# Patient Record
Sex: MALE | Race: WHITE
[De-identification: names, ages, dates, MRNs, and addresses within clinical notes are randomized per-mention and may not be internally consistent; named-entity substitution may affect disease eponyms.]

---

## 2018-08-17 ENCOUNTER — HOSPITAL ENCOUNTER (OUTPATIENT)
Dept: HOSPITAL 97 - OR | Age: 68
Discharge: HOME | End: 2018-08-17
Attending: SURGERY
Payer: COMMERCIAL

## 2018-08-17 DIAGNOSIS — K64.8: ICD-10-CM

## 2018-08-17 DIAGNOSIS — Z12.11: Primary | ICD-10-CM

## 2018-08-17 DIAGNOSIS — Z82.49: ICD-10-CM

## 2018-08-17 DIAGNOSIS — I10: ICD-10-CM

## 2018-08-17 DIAGNOSIS — Z80.42: ICD-10-CM

## 2018-08-17 DIAGNOSIS — K57.30: ICD-10-CM

## 2018-08-17 PROCEDURE — 0DJD8ZZ INSPECTION OF LOWER INTESTINAL TRACT, VIA NATURAL OR ARTIFICIAL OPENING ENDOSCOPIC: ICD-10-PCS

## 2018-08-17 NOTE — ENDO RPT
[address], [address]





COLONOSCOPY PROCEDURE REPORT     EXAM DATE: 08/17/2018



PATIENT NAME:      Francisco Reagan           MR #:      [id number]

YOB: 1950      VISIT #:     [id number]

ATTENDING:     Milo Aragon DR     STATUS:     outpatient

ASSISTANT:      Elizabeth Elizondo and Lissa Miner RN



INDICATIONS:  The patient is a 67 yr old Male here for a colonoscopy due to

colon cancer screening

PROCEDURE PERFORMED:     Colonoscopy and Screening Colonoscopy

MEDICATIONS:     Per Anesthesia.

ESTIMATED BLOOD LOSS:     None



CONSENT: The patient understands the risks and benefits of the procedure and

understands that these risks include, but are not limited to: sedation,

allergic reaction, infection, perforation and/or bleeding. Alternative means of

evaluation and treatment include, among others: physical exam, x-rays, and/or

surgical intervention. The patient elects to proceed with this endoscopic

procedure.



DESCRIPTION OF PROCEDURE:  During intra-op preparation period all mechanical 

medical equipment was checked for proper function. Hand hygiene and appropriate

measures for infection prevention was taken.  Procedure, possible

complications, alternatives including, but not limited to possibility of

bleeding, perforation, tear, infection, sepsis, need for surgery, need for

blood transfusion, were explained to the patient.  After the risks, benefits

and alternatives of the procedure were thoroughly explained, Informed consent

was verified, confirmed and timeout was successfully executed by the treatment

team.  The patient was placed in the left lateral position.   A digital rectal

exam was performed and revealed internal hemorrhoids and A digital rectal exam

was performed and revealed an enlarged prostate.  After appropriate level of

anesthesia, the scope was passed.  The EC-[id number] ([id number] endoscope was

introduced through the anus and advanced to the cecum, which was identified by

both the appendix and ileocecal valve. The quality of the prep was good. The

instrument was then slowly withdrawn as the colon was fully examined.  Scope

withdrawal time was 9 minutes.



COLON FINDINGS: Mild diverticulosis was noted in the sigmoid colon.  No bleeding

was noted from the diverticulosis.   Moderate sized internal hemorrhoids were

found.  Retroflexed views revealed no abnormalities. The scope was then

completely withdrawn from the patient and the procedure terminated.







ADVERSE EVENTS:      There were no complications.

IMPRESSIONS:     1.  Mild diverticulosis was noted in the sigmoid colon

2.  Moderate sized internal hemorrhoids



RECOMMENDATIONS:     1.  fiber rich diet

2.  hemorrhoidal hygiene

3.  yearly hemoccult starting in 4 years

RECALL:     Return in 10 year(s) for Colonoscopy.



_____________________________

Milo Aragon DR

eSigned:  Milo Aragon DR 08/17/2018 9:01 AM





cc:



CPT CODES:

ICD9 CODES:





PATIENT NAME:  Francisco ReaganBrian

MR#: [id number]

## 2022-12-21 ENCOUNTER — HOSPITAL ENCOUNTER (EMERGENCY)
Dept: HOSPITAL 97 - ER | Age: 72
Discharge: HOME | End: 2022-12-21
Payer: COMMERCIAL

## 2022-12-21 VITALS — OXYGEN SATURATION: 98 % | SYSTOLIC BLOOD PRESSURE: 138 MMHG | DIASTOLIC BLOOD PRESSURE: 91 MMHG

## 2022-12-21 VITALS — TEMPERATURE: 97.8 F

## 2022-12-21 DIAGNOSIS — E86.0: Primary | ICD-10-CM

## 2022-12-21 LAB
ALBUMIN SERPL BCP-MCNC: 3.3 G/DL (ref 3.4–5)
ALP SERPL-CCNC: 71 U/L (ref 45–117)
ALT SERPL W P-5'-P-CCNC: 17 U/L (ref 16–61)
AST SERPL W P-5'-P-CCNC: 13 U/L (ref 15–37)
BUN BLD-MCNC: 17 MG/DL (ref 7–18)
GLUCOSE SERPLBLD-MCNC: 99 MG/DL (ref 74–106)
HCT VFR BLD CALC: 38.2 % (ref 39.6–49)
LYMPHOCYTES # SPEC AUTO: 1.1 K/UL (ref 0.7–4.9)
MAGNESIUM SERPL-MCNC: 2.2 MG/DL (ref 1.6–2.4)
MCV RBC: 91 FL (ref 80–100)
PMV BLD: 7.8 FL (ref 7.6–11.3)
POTASSIUM SERPL-SCNC: 3.7 MMOL/L (ref 3.5–5.1)
RBC # BLD: 4.2 M/UL (ref 4.33–5.43)
TROPONIN I SERPL HS-MCNC: 9.1 PG/ML (ref ?–58.9)

## 2022-12-21 PROCEDURE — 80053 COMPREHEN METABOLIC PANEL: CPT

## 2022-12-21 PROCEDURE — 36415 COLL VENOUS BLD VENIPUNCTURE: CPT

## 2022-12-21 PROCEDURE — 93005 ELECTROCARDIOGRAM TRACING: CPT

## 2022-12-21 PROCEDURE — 83735 ASSAY OF MAGNESIUM: CPT

## 2022-12-21 PROCEDURE — 85025 COMPLETE CBC W/AUTO DIFF WBC: CPT

## 2022-12-21 PROCEDURE — 81015 MICROSCOPIC EXAM OF URINE: CPT

## 2022-12-21 PROCEDURE — 81003 URINALYSIS AUTO W/O SCOPE: CPT

## 2022-12-21 PROCEDURE — 82550 ASSAY OF CK (CPK): CPT

## 2022-12-21 PROCEDURE — 84484 ASSAY OF TROPONIN QUANT: CPT

## 2022-12-21 NOTE — XMS REPORT
Continuity of Care Document

                          Created on:2022



Patient:FRANCISCO QIU

Sex:Male

:1950

External Reference #:[id number]





Demographics







                          Address                   [address] [place] [address]

 

                          Home Phone                [phone]

 

                          Mobile Phone              [phone]

 

                          Email Address             [email or web address]

 

                          Preferred Language        en

 

                          Marital Status            Unknown

 

                          Faith Affiliation     Unknown

 

                          Race                      Unknown

 

                          Additional Race(s)        White

 

                          Ethnic Group              Not  or 









Author







                          Organization              [facility]

t

 

                          Address                   [address]



                                                    [place] [address]

 

                          Phone                     [phone]









Support







                Name            Relationship    Address         Phone

 

                Francisco Qiu     [address] [address] [phone]



                                                [place] [address] 

 

                Franchesca Qiu Spouse          [address] [address] [phone]



                                                [place] [address] 









Care Team Providers







                    Name                Role                Phone

 

                    ErvinKiah ACOSTA  Primary Care Physician [phone]

 

                    Sheri Medrano        Attending Clinician Unavailable

 

                    JIM SHEFFIELD      Attending Clinician Unavailable

 

                    JIM SHEFFIELD      Attending Clinician Unavailable

 

                    Zion Mahan MD Attending Clinician [phone]

 

                    Jim Sheffield MD   Attending Clinician [phone]

 

                    Doctor Unassigned, No Name Attending Clinician Unavailable

 

                    Angelita Michelle LMSW Attending Clinician [phone]

 

                    ZION MAHAN Attending Clinician Unavailable

 

                    ZION MAHAN Attending Clinician Unavailable

 

                    Nettie Garcia PA-C   Attending Clinician [phone]

 

                    NETTIE GARCIA        Attending Clinician Unavailable

 

                    ELLY TYSON      Attending Clinician Unavailable

 

                    Elly Hinton  Attending Clinician [phone]

 

                    Eduardo Gamez MD Attending Clinician [phone]









Payers







           Payer Name Policy Type Policy Number Effective Date Expiration Date S

ource MEDICARE MB         [id number]                       [facility]



           NOVITAS                                                - [facility]

 

           [employer] C1         [id number]                       [facility]

t



           INS [place]                                                 - [facility]







Problems







       Condition Condition Condition Status Onset  Resolution Last   Treating Co

mments 

Source



       Name   Details Category        Date   Date   Treatment Clinician        



                                                 Date                 

 

       S/P total S/P total Disease Active                              Uni

vers



       hip    hip                  2-20                               ity of



       arthroplas arthroplas               00:00:                             [facility]

[place]



       ty     ty                   00                                 Medical



                                                                      [facility]

 

       [id number] Hyperlipid Problem                                           Com

mon



              emia,                                                   [facility]



              [facility]specifie                                                  [facility]



              d                                                       



              hyperlipid                                                  [facility]



              emia type                                                  [facility]

 

       [id number] Screening Problem                                           Com

mon



              for                                                     [facility]



              prostate                                                  - [facility]



              cancer                                                  [facility]

 

       [id number] Memory Problem                                           Common



              loss                                                    [facility]



                                                                      - [facility]

 

       [id number] Cognitive Problem                                           Com

mon



              dysfunctio                                                  [facility]



              n                                                       - [facility]



                                                                      [facility]

 

       [id number] Alzheimer' Problem                                           C

ommon



       [id number] s disease                                                  [facility]



              with late                                                  - [facility]



              onset                                                   [facility]

 

       [id number] +6th digit Problem                                           C

ommon



       103    eff                                                     [facility]



              10/1/22*De                                                  - [facility]



              mentia in                                                  [facility]                                                  Center



              elsewhere                                                  



              with                                                    



              behavioral                                                  



              disturbanc                                                  



              e                                                       

 

       Dementia Dementia Problem                                           Commo

n



                                                                      [facility]







Allergies, Adverse Reactions, Alerts







       Allergy Allergy Status Severity Reaction(s) Onset  Inactive Treating Comm

ents 

Source



       Name   Type                        Date   Date   Clinician        

 

       NO KNOWN Drug   Active                                           [facility]



       ALLERGIE Class                                                   [facility] of



       S                                                              [facility]







Social History







           Social Habit Start Date Stop Date  Quantity   Comments   Source

 

           History of                                             [facility] 

-



           Tobacco Use                                             [facility]

 

           Sex Assigned At                                             [facility]

paul -



           Birth                                                  [facility]

 

           Exposure to 2022 Not sure              [facility]



           SARS-CoV-2 00:00:00   09:01:00                         [facility]



           (event)                                                [facility]

 

           Alcohol intake 2022 0 /d                  [facility]

 



                      00:00:00   00:00:00                         [facility]

 

           Tobacco use and 2016 Smokeless tobacco            Un

iversity of



           exposure   00:00:00   00:00:00   non-user              [facility]









                Smoking Status  Start Date      Stop Date       Source

 

                Never smoked tobacco                                 [facility]







Medications







       Ordered Filled Start  Stop   Current Ordering Indication Dosage Frequency

 Signature

                    Comments            Components          Source



     Medication Medication Date Date Medication? Clinician                (SIG) 

          



     Name Name                                                   

 

     rivastigmin      2022      Yes       [id number] 1{patch      Apply 1       

    [facility]



     e 9.5 mg/24      1-16                     }         Patch to           ity 

of



     hour patch      00:00:                               skin in           [place]

s



               00                                 the            Medical



                                                  morning.           Branch

 

     rivastigmin      2022      Yes       [id number] 1{patch      Apply 1       

    [facility]



     e 9.5 mg/24      1-16                     }         Patch to           ity 

of



     hour patch      00:00:                               skin in           [place]

                                 the            Medical



                                                  morning.           Branch

 

     rivastigmin      2022      Yes       [id number] 1{patch      Apply 1       

    [facility]



     e 9.5 mg/24      1-16                     }         Patch to           ity 

of



     hour patch      00:00:                               skin in           [place]

                                 the            Medical



                                                  morning.           Branch

 

     rivastigmin      2022      Yes       [id number] 1{patch      Apply 1       

    [facility]



     e 9.5 mg/24      1-16                     }         Patch to           ity 

of



     hour patch      00:00:                               skin in           [place]

                                 the            Medical



                                                  morning.           Branch

 

     rivastigmin      2022      Yes       [id number] 1{patch      Apply 1       

    [facility]



     e 9.5 mg/24      1-16                     }         Patch to           ity 

of



     hour patch      00:00:                               skin in           a

                                 the            Medical



                                                  morning.           Branch

 

     rivastigmin      2022      Yes       [id number] 1{patch      Apply 1       

    [facility]



     e 9.5 mg/24      1-16                     }         Patch to           ity 

of



     hour patch      00:00:                               skin in           a

                                 the            Medical



                                                  morning.           Branch

 

     rivastigmin            Yes            13.3mg      Apply 13.3         

  [facility]



     e (EXELON      9-14                               mg to skin           ity 

of



     PATCH) 13.3      00:00:                               daily.           [place]

s



     mg/24 hour      00                                                Medical



     PT24                                                        Branch

 

     rivastigmin      -0      Yes            13.3mg      Apply 13.3         

  [facility]



     e (EXELON      9-14                               mg to skin           ity 

of



     PATCH) 13.3      00:00:                               daily.           [place]

s



     mg/24 hour      00                                                Medical



     PT24                                                        Branch

 

     rivastigmin      -0      Yes            13.3mg      Apply 13.3         

  [facility]



     e (EXELON      9-14                               mg to skin           ity 

of



     PATCH) 13.3      00:00:                               daily.           [place]

s



     mg/24 hour      00                                                Medical



     PT24                                                        Branch

 

     rivastigmin      -0      Yes            13.3mg      Apply 13.3         

  [facility]



     e (EXELON      9-14                               mg to skin           ity 

of



     PATCH) 13.3      00:00:                               daily.           [place]

s



     mg/24 hour      00                                                Medical



     PT24                                                        Branch

 

     rivastigmin      -0      Yes            13.3mg      Apply 13.3         

  Univers



     e (EXELON      9-14                               mg to skin           ity 

of



     PATCH) 13.3      00:00:                               daily.           [place]

s



     mg/24 hour      00                                                Medical



     PT24                                                        Branch

 

     rivastigmin      -0      Yes            13.3mg      Apply 13.3         

  Univers



     e (EXELON      9-14                               mg to skin           ity 

of



     PATCH) 13.3      00:00:                               daily.           [place]

s



     mg/24 hour      00                                                Medical



     PT24                                                        Branch

 

     rivastigmin      -0      Yes            13.3mg      Apply 13.3         

  Univers



     e (EXELON      9-14                               mg to skin           ity 

of



     PATCH) 13.3      00:00:                               daily.           [place]

s



     mg/24 hour      00                                                Medical



     PT24                                                        Branch

 

     rivastigmin      -0      Yes            13.3mg      Apply 13.3         

  Univers



     e (EXELON      9-14                               mg to skin           ity 

of



     PATCH) 13.3      00:00:                               daily.           [place]

s



     mg/24 hour      00                                                Medical



     PT24                                                        Branch

 

     rivastigmin      -0      Yes            13.3mg      Apply 13.3         

  Univers



     e (EXELON      9-14                               mg to skin           ity 

of



     PATCH) 13.3      00:00:                               daily.           [place]

s



     mg/24 hour      00                                                Medical



     PT24                                                        Branch

 

     rivastigmin      -0      Yes            13.3mg      Apply 13.3         

  Univers



     e (EXELON      9-14                               mg to skin           ity 

of



     PATCH) 13.3      00:00:                               daily.           [place]

s



     mg/24 hour      00                                                Medical



     PT24                                                        Branch

 

     rivastigmin      -0      Yes            13.3mg      Apply 13.3         

  Univers



     e (EXELON      9-14                               mg to skin           ity 

of



     PATCH) 13.3      00:00:                               daily.           [place]

s



     mg/24 hour      00                                                Medical



     PT24                                                        Branch

 

     rivastigmin      -0      Yes            13.3mg      Apply 13.3         

  Univers



     e (EXELON      9-14                               mg to skin           ity 

of



     PATCH) 13.3      00:00:                               daily.           [place]

s



     mg/24 hour      00                                                Medical



     PT24                                                        Branch

 

     rivastigmin      -0      Yes            13.3mg      Apply 13.3         

  Univers



     e (EXELON      9-14                               mg to skin           ity 

of



     PATCH) 13.3      00:00:                               daily.           [place]

s



     mg/24 hour      00                                                Medical



     PT24                                                        Branch

 

     rivastigmin      -0      Yes            13.3mg      Apply 13.3         

  Univers



     e (EXELON      9-14                               mg to skin           ity 

of



     PATCH) 13.3      00:00:                               daily.           [place]

s



     mg/24 hour      00                                                Medical



     PT24                                                        Branch

 

     rivastigmin            Yes            13.3mg      Apply 13.3         

  [facility]



     e (EXELON      9-14                               mg to skin           ity 

of



     PATCH) 13.3      00:00:                               daily.           [place]

s



     mg/24 hour      00                                                Medical



     PT24                                                        Branch

 

     rivastigmin      2022- No             13.3mg      Apply 13.3        

   Univers



     e (EXELON      9-14 11-16                          mg to skin           ity

 of



     PATCH) 13.3      00:00: 00:00                          daily.           [place]

as



     mg/24 hour      00   :00                                          Medical



     PT24                                                        Branch

 

     EXELON      2022- No             13.3mg      Apply 13.3           Un

nelly



     PATCH 13.3      9-08 09-14                          mg to skin           it

y of



     mg/24 hour      00:00: 00:00                          daily.           [place]

s



     PT24      00   :00                                          Medical



                                                                 Branch

 

     divalproex            Yes       [id number] 125mg      Take 1        

   Univers



     Sprinkles      9-06                [id number]           capsule by           it

y of



     (DEPAKOTE      00:00:                               mouth           [place]



     SPRINKLES)      00                                 every 12           Medic

al



     125 mg                                         (twelve)           Branch



     SPRINKLE                                         hours.           



     capsule                                                        

 

     divalproex            Yes       [id number] 125mg      Take 1        

   Univers



     Sprinkles      9-06                [id number]           capsule by           it

y of



     (DEPAKOTE      00:00:                               mouth           [place]



     SPRINKLES)      00                                 every 12           Medic

al



     125 mg                                         (twelve)           Branch



     SPRINKLE                                         hours.           



     capsule                                                        

 

     divalproex            Yes       [id number] 125mg      Take 1        

   Univers



     Sprinkles      9-06                [id number]           capsule by           it

y of



     (DEPAKOTE      00:00:                               mouth           [place]



     SPRINKLES)      00                                 every 12           Medic

al



     125 mg                                         (twelve)           Branch



     SPRINKLE                                         hours.           



     capsule                                                        

 

     divalproex            Yes       [id number] 125mg      Take 1           

Univers



     Sprinkles      9-06                               capsule by           ity 

of



     (DEPAKOTE      00:00:                               mouth           [place]



     SPRINKLES)      00                                 every 12           Medic

al



     125 mg                                         (twelve)           Branch



     SPRINKLE                                         hours.           



     capsule                                                        

 

     divalproex            Yes       [id number] 125mg      Take 1           

Univers



     Sprinkles      9-06                               capsule by           ity 

of



     (DEPAKOTE      00:00:                               mouth           [place]



     SPRINKLES)      00                                 every 12           Medic

al



     125 mg                                         (twelve)           Branch



     SPRINKLE                                         hours.           



     capsule                                                        

 

     divalproex      2022-0      Yes       [id number] 125mg      Take 1           

Univers



     Sprinkles      9-06                               capsule by           ity 

of



     (DEPAKOTE      00:00:                               mouth           [place]



     SPRINKLES)      00                                 every 12           Medic

al



     125 mg                                         (twelve)           Branch



     SPRINKLE                                         hours.           



     capsule                                                        

 

     divalproex      0      Yes       [id number] 125mg      Take 1           

Univers



     Sprinkles      9-06                               capsule by           ity 

of



     (DEPAKOTE      00:00:                               mouth           [place]



     SPRINKLES)      00                                 every 12           Medic

al



     125 mg                                         (twelve)           Branch



     SPRINKLE                                         hours.           



     capsule                                                        

 

     divalproex            Yes       [id number] 125mg      Take 1           

Univers



     Sprinkles      9-06                               capsule by           ity 

of



     (DEPAKOTE      00:00:                               mouth           [place]



     SPRINKLES)      00                                 every 12           Medic

al



     125 mg                                         (twelve)           Branch



     SPRINKLE                                         hours.           



     capsule                                                        

 

     divalproex            Yes       [id number] 125mg      Take 1           

Univers



     Sprinkles      9-06                               capsule by           ity 

of



     (DEPAKOTE      00:00:                               mouth           [place]



     SPRINKLES)      00                                 every 12           Medic

al



     125 mg                                         (twelve)           Branch



     SPRINKLE                                         hours.           



     capsule                                                        

 

     divalproex            Yes       [id number] 125mg      Take 1           

Univers



     Sprinkles      9-06                               capsule by           ity 

of



     (DEPAKOTE      00:00:                               mouth           [place]



     SPRINKLES)      00                                 every 12           Medic

al



     125 mg                                         (twelve)           Branch



     SPRINKLE                                         hours.           



     capsule                                                        

 

     divalproex            Yes       [id number] 125mg      Take 1           

[facility]



     Sprinkles      9-06                               capsule by           [facility] 

of



     (DEPAKOTE      00:00:                               mouth           [place]



     SPRINKLES)      00                                 every 12           [facility]

al



     125 mg                                         (twelve)           [facility]



     SPRINKLE                                         hours.           



     capsule                                                        

 

     divalproex            Yes       [id number] 125mg      Take 1           

[facility]



     Sprinkles      9-06                               capsule by           [facility] 

of



     (DEPAKOTE      00:00:                               mouth           [place]



     SPRINKLES)      00                                 every 12           Medic

al



     125 mg                                         (twelve)           [facility]



     SPRINKLE                                         hours.           



     capsule                                                        

 

     divalproex      -0      Yes       [id number] 125mg      Take 1           

[facility]



     Sprinkles      9-06                               capsule by           [facility] 

of



     (DEPAKOTE      00:00:                               mouth           [place]



     SPRINKLES)      00                                 every 12           Med[facility]

al



     125 mg                                         (twelve)           [facility]



     SPRINKLE                                         hours.           



     capsule                                                        

 

     divalproex      0      Yes       [id number] 125mg      Take 1           

[facility]



     Sprinkles      9-06                               capsule by           [facility] 

of



     (DEPAKOTE      00:00:                               mouth           [place]



     SPRINKLES)      00                                 every 12           Med[facility]

al



     125 mg                                         (twelve)           [facility]



     SPRINKLE                                         hours.           



     capsule                                                        

 

     divalproex      2022-0      Yes       [id number] 125mg      Take 1           

[facility]



     Sprinkles      9-06                               capsule by           [facility] 

of



     (DEPAKOTE      00:00:                               mouth           [place]



     SPRINKLES)      00                                 every 12           Med[facility]

al



     125 mg                                         (twelve)           [facility]



     SPRINKLE                                         hours.           



     capsule                                                        

 

     divalproex      2022-0      Yes       [id number] 125mg      Take 1           

[facility]



     Sprinkles      9-06                               capsule by           [facility] 

of



     (DEPAKOTE      00:00:                               mouth           [place]



     SPRINKLES)      00                                 every 12           Med[facility]

al



     125 mg                                         (twelve)           [facility]



     SPRINKLE                                         hours.           



     capsule                                                        

 

     divalproex      2022-0      Yes       [id number] 125mg      Take 1           

[facility]



     Sprinkles      9-06                               capsule by           [facility] 

of



     (DEPAKOTE      00:00:                               mouth           [place]



     SPRINKLES)      00                                 every 12           Med[facility]

al



     125 mg                                         (twelve)           [facility]



     SPRINKLE                                         hours.           



     capsule                                                        

 

     divalproex      2022-0      Yes       [id number] 125mg      Take 1           

[facility]



     Sprinkles      9-06                               capsule by           [facility] 

of



     (DEPAKOTE      00:00:                               mouth           [place]



     SPRINKLES)      00                                 every 12           Med[facility]

al



     125 mg                                         (twelve)           [facility]



     SPRINKLE                                         hours.           



     capsule                                                        

 

     divalproex      2022-0      Yes       [id number] 125mg      Take 1           

[facility]



     Sprinkles      9-06                               capsule by           ity 

of



     (DEPAKOTE      00:00:                               mouth           [place]



     SPRINKLES)      00                                 every 12           Medic

al



     125 mg                                         (twelve)           [facility]



     SPRINKLE                                         hours.           



     capsule                                                        

 

     divalproex      2022-0      Yes       [id number] 125mg      Take 1           

[facility]



     Sprinkles      9-06                               capsule by           ity 

of



     (DEPAKOTE      00:00:                               mouth           [place]



     SPRINKLES)      00                                 every 12           Medic

al



     125 mg                                         (twelve)           [facility]



     SPRINKLE                                         hours.           



     capsule                                                        

 

     divalproex      2022-0      Yes       [id number] 125mg      Take 1           

[facility]



     Sprinkles      9-06                               capsule by           ity 

of



     (DEPAKOTE      00:00:                               mouth           [place]



     SPRINKLES)      00                                 every 12           Medic

al



     125 mg                                         (twelve)           [facility]



     SPRINKLE                                         hours.           



     capsule                                                        

 

     divalproex      2022-0      Yes       [id number] 125mg      Take 1           

[facility]



     Sprinkles      9-06                               capsule by           ity 

of



     (DEPAKOTE      00:00:                               mouth           [place]



     SPRINKLES)      00                                 every 12           Medic

al



     125 mg                                         (twelve)           [facility]



     SPRINKLE                                         hours.           



     capsule                                                        

 

     divalproex      2022-0      Yes       [id number] 125mg      Take 1           

[facility]



     Sprinkles      9-06                               capsule by           [facility] 

of



     (DEPAKOTE      00:00:                               mouth           [place]



     SPRINKLES)      00                                 every 12           Medic

al



     125 mg                                         (twelve)           [facility]



     SPRINKLE                                         hours.           



     capsule                                                        

 

     rivastigmin      0 - No        [id number] 13.3mg      Apply 13.3 

          [facility]



     e 13.3      9-06 09-14           [id number]           mg to skin           [facility] 

of



     mg/24 hour      00:00: 00:00                          daily.           [facility]

s



     PT24      00   :                                          Medical



                                                                 [facility]

 

     memantine 5      -0      Yes            5mg       Take 5 mg           U

nivers



     mg tablet      5-05                               by mouth           [facility] of



               00:00:                               daily.           [place]



                                                               [facility]



                                                                 [facility]

 

     memantine 5      -0      Yes            5mg       Take 5 mg           U

nivers



     mg tablet      5-05                               by mouth           [facility] of



               00:00:                               daily.           [place]



                                                               [facility]

 

     memantine 5      -0      Yes            5mg       Take 5 mg           U

nivers



     mg tablet      5-05                               by mouth           ity of



               00:00:                               daily.           [place]



                                                               [facility]

 

     memantine 5      -0      Yes            5mg       Take 5 mg           U

nivers



     mg tablet      5-05                               by mouth           ity of



               00:00:                               daily.           [place]



                                                               [facility]

 

     memantine 5      -0      Yes            5mg       Take 5 mg           U

nivers



     mg tablet      5-05                               by mouth           ity of



               00:00:                               daily.           [place]



                                                               [facility]

 

     memantine 5      -0      Yes            5mg       Take 5 mg           U

nivers



     mg tablet      5-05                               by mouth           ity of



               00:00:                               daily.           [place]



                                                               [facility]

 

     memantine 5      -0      Yes            5mg       Take 5 mg           U

nivers



     mg tablet      5-05                               by mouth           ity of



               00:00:                               daily.           [place]



                                                               [facility]

 

     memantine 5      -0      Yes            5mg       Take 5 mg           U

nivers



     mg tablet      5-05                               by mouth           ity of



               00:00:                               daily.           [place]



                                                               [facility]

 

     memantine 5      -0      Yes            5mg       Take 5 mg           U

nivers



     mg tablet      5-05                               by mouth           [facility] of



               00:00:                               daily.           [place]



                                                               [facility]

 

     memantine 5      -0      Yes            5mg       Take 5 mg           U

nivers



     mg tablet      5-05                               by mouth           ity of



               00:00:                               daily.           [place]



                                                               [facility]

 

     memantine 5      -0      Yes            5mg       Take 5 mg           U

nivers



     mg tablet      5-05                               by mouth           ity of



               00:00:                               daily.           [place]



               00                                                [facility]



                                                                 [facility]

 

     memantine 5      -0      Yes            5mg       Take 5 mg           U

nivers



     mg tablet      5-05                               by mouth           ity of



               00:00:                               daily.           [place]



               00                                                [facility]



                                                                 [facility]

 

     memantine 5      -0      Yes            5mg       Take 5 mg           U

nivers



     mg tablet      5-05                               by mouth           ity of



               00:00:                               daily.           [place]



                                                               [facility]



                                                                 [facility]

 

     memantine 5      -0      Yes            5mg       Take 5 mg           U

nivers



     mg tablet      5-05                               by mouth           ity of



               00:00:                               daily.           [place]



               00                                                [facility]



                                                                 [facility]

 

     memantine 5      -0      Yes            5mg       Take 5 mg           U

nivers



     mg tablet      5-05                               by mouth           ity of



               00:00:                               daily.           [place]



                                                               [facility]



                                                                 [facility]

 

     memantine 5      -0      Yes            5mg       Take 5 mg           U

nivers



     mg tablet      5-05                               by mouth           ity of



               00:00:                               daily.           [place]



                                                               [facility]

 

     memantine 5      -0      Yes            5mg       Take 5 mg           U

nivers



     mg tablet      5-05                               by mouth           ity of



               00:00:                               daily.           [place]



                                                               [facility]

 

     memantine 5      -0      Yes            5mg       Take 5 mg           U

nivers



     mg tablet      5-05                               by mouth           ity of



               00:00:                               daily.           [place]



                                                               [facility]



                                                                 [facility]

 

     memantine 5      -0      Yes            5mg       Take 5 mg           U

nivers



     mg tablet      5-05                               by mouth           ity of



               00:00:                               daily.           [place]



                                                               [facility]



                                                                 [facility]

 

     memantine 5      -0      Yes            5mg       Take 5 mg           U

nivers



     mg tablet      5-05                               by mouth           ity of



               00:00:                               daily.           [place]



                                                               [facility]

 

     memantine 5      -0      Yes            5mg       Take 5 mg           U

nivers



     mg tablet      5-05                               by mouth           ity of



               00:00:                               daily.           [place]



                                                               [facility]



                                                                 [facility]

 

     memantine 5      -0      Yes            5mg       Take 5 mg           U

nivers



     mg tablet      5-05                               by mouth           ity of



               00:00:                               daily.           [place]



                                                               [facility]



                                                                 [facility]anch

 

     memantine 5      -0      Yes            5mg       Take 5 mg           U

nivers



     mg tablet      5-05                               by mouth           ity of



               00:00:                               daily.           [place]



                                                               [facility]

 

     memantine 5      -0      Yes            5mg       Take 5 mg           U

nivers



     mg tablet      5-05                               by mouth           ity of



               00:00:                               daily.           [place]



                                                               [facility]

 

     CholestOff CholestOff 2018-0      Yes  Sheri                as             

Common



                          [place]                directed           [facility]



               00:00:                                              - [facility]



               00                                                [facility]

 

     Fish Oil Fish Oil       Yes  Sheri                1 capsule          

 Common



                          [place]                               [facility]



               00:00:                                              - [facility]



               00                                                [facility]

 

     Co Q10 Co Q10       Yes  Sheri                1 tablet           Comm

on



                          [place]                with a           [facility]



               00:00:                               meal           - [facility]



               00                                                [facility]

 

     CholestOff CholestOff       No                       CholestOff      

     



     450  MG -                               450 MG           



               00:00:                                              



               00                                                

 

     CholestOff CholestOff       No                       CholestOff      

     



     450  MG -                               450 MG           



               00:00:                                              



               00                                                

 

     Fish Oil Fish Oil       No             1{capsu QD   Fish Oil         

  



     1000 MG 1000 MG                      le}       1000 MG           



               00:00:                                              



               00                                                

 

     Co Q10 100 Co Q10 100       No             1{table QD   Co Q10 100   

        



     MG   MG                        t_with_      MG             



               00:00:                     a_meal}                     



               00                                                

 

     CholestOff CholestOff       No                       CholestOff      

     



     450  MG -                               450 MG           



               00:00:                                              



               00                                                

 

     Co Q10 100 Co Q10 100       No             1{table QD   Co Q10 100   

        



     MG   MG   -                     t_with_      MG             



               00:00:                     a_meal}                     



               00                                                

 

     Fish Oil Fish Oil       No             1{capsu QD   Fish Oil         

  



     1000 MG 1000 MG                      le}       1000 MG           



               00:00:                                              



               00                                                

 

     Co Q10 100 Co Q10 100       No             1{table QD   Co Q10 100   

        



     MG   MG   -                     t_with_      MG             



               00:00:                     a_meal}                     



                                                               

 

     Fish Oil Fish Oil       No             1{capsu QD   Fish Oil         

  



     1000 MG 1000 MG                      le}       1000 MG           



               00:00:                                              



               00                                                

 

     CholestOff CholestOff       No                       CholestOff      

     



     450  MG -                               450 MG           



               00:00:                                              



               00                                                

 

     Co Q10 100 Co Q10 100       No             1{table QD   Co Q10 100   

        



     MG   MG   -                     t_with_      MG             



               00:00:                     a_meal}                     



                                                               

 

     Fish Oil Fish Oil       No             1{capsu QD   Fish Oil         

  



     1000 MG 1000 MG                      le}       1000 MG           



               00:00:                                              



               00                                                

 

     CholestOff CholestOff       No                       CholestOff      

     



     450  MG -                               450 MG           



               00:00:                                              



               00                                                

 

     Co Q10 100 Co Q10 100       No             1{table QD   Co Q10 100   

        



     MG   MG   7-09                     t_with_      MG             



               00:00:                     a_meal}                     



               00                                                

 

     CholestOff CholestOff -      No                       CholestOff      

     



     450  MG 7-                               450 MG           



               00:00:                                              



               00                                                

 

     Fish Oil Fish Oil       No             1{capsu QD   Fish Oil         

  



     1000 MG 1000 MG 7                     le}       1000 MG           



               00:00:                                              



               00                                                

 

     CholestOff CholestOff       No                       CholestOff      

     



     450  MG 7-                               450 MG           



               00:00:                                              



               00                                                

 

     Fish Oil Fish Oil       No             1{capsu QD   Fish Oil         

  



     1000 MG 1000 MG 7                     le}       1000 MG           



               00:00:                                              



               00                                                

 

     Co Q10 100 Co Q10 100       No             1{table QD   Co Q10 100   

        



     MG   MG   7-                     t_with_      MG             



               00:00:                     a_meal}                     



               00                                                

 

     Co Q10 100 Co Q10 100       No             1{table QD   Co Q10 100   

        



     MG   MG   7-                     t_with_      MG             



               00:00:                     a_meal}                     



               00                                                

 

     Fish Oil Fish Oil       No             1{capsu QD   Fish Oil         

  



     1000 MG 1000 MG                      le}       1000 MG           



               00:00:                                              



               00                                                

 

     CholestOff CholestOff       No                       CholestOff      

     



     450  MG                                450 MG           



               00:00:                                              



               00                                                

 

     Co Q10 100 Co Q10 100       No             1{table QD   Co Q10 100   

        



     MG   MG   7-                     t_with_      MG             



               00:00:                     a_meal}                     



                                                               

 

     Fish Oil Fish Oil       No             1{capsu QD   Fish Oil         

  



     1000 MG 1000 MG                      le}       1000 MG           



               00:00:                                              



               00                                                

 

     FLUZONE      2017      Yes                      IMMUNIZATI           Univ

ers



     HIGH-DOSE      0-16                               ON GIVEN           ity of



     ,      00:00:                                              [place]



     PF, 180      00                                                Medical



     mcg/0.5 mL                                                        Branch

 

     FLUZONE      2017      Yes                      IMMUNIZATI           Univ

ers



     HIGH-DOSE      0-16                               ON GIVEN           ity of



     ,      00:00:                                              [place]



     PF, 180      00                                                Medical



     mcg/0.5 mL                                                        Branch

 

     FLUZONE      2017      Yes                      IMMUNIZATI           Univ

ers



     HIGH-DOSE      0-16                               ON GIVEN           ity of



     ,      00:00:                                              [place]



     PF, 180      00                                                Medical



     mcg/0.5 mL                                                        Branch

 

     FLUZONE      2017      Yes                      IMMUNIZATI           Univ

ers



     HIGH-DOSE      0-16                               ON GIVEN           ity of



     ,      00:00:                                              [place]



     PF, 180      00                                                Medical



     mcg/0.5 mL                                                        Branch

 

     FLUZONE      2017      Yes                      IMMUNIZATI           Univ

ers



     HIGH-DOSE      0-16                               ON GIVEN           ity of



     ,      00:00:                                              [place]



     PF, 180      00                                                Medical



     mcg/0.5 mL                                                        Branch

 

     FLUZONE      2017      Yes                      IMMUNIZATI           Univ

ers



     HIGH-DOSE      0-16                               ON GIVEN           ity of



     ,      00:00:                                              [place]



     PF, 180      00                                                Medical



     mcg/0.5 mL                                                        Branch

 

     FLUZONE      2017      Yes                      IMMUNIZATI           Univ

ers



     HIGH-DOSE      0-16                               ON GIVEN           ity of



     ,      00:00:                                              [place]



     PF, 180      00                                                Medical



     mcg/0.5 mL                                                        Branch

 

     FLUZONE      2017      Yes                      IMMUNIZATI           Univ

ers



     HIGH-DOSE      0-16                               ON GIVEN           ity of



     ,      00:00:                                              [place]



     PF, 180      00                                                Medical



     mcg/0.5 mL                                                        Branch

 

     FLUZONE      2017      Yes                      IMMUNIZATI           Univ

ers



     HIGH-DOSE      0-16                               ON GIVEN           ity of



     ,      00:00:                                              [place]



     PF, 180      00                                                Medical



     mcg/0.5 mL                                                        Branch

 

     FLUZONE      2017      Yes                      IMMUNIZATI           Univ

ers



     HIGH-DOSE      0-16                               ON GIVEN           ity of



     ,      00:00:                                              [place]



     PF, 180      00                                                Medical



     mcg/0.5 mL                                                        Branch

 

     FLUZONE      2017      Yes                      IMMUNIZATI           Univ

ers



     HIGH-DOSE      0-16                               ON GIVEN           ity of



     ,      00:00:                                              [place]



     PF, 180      00                                                Medical



     mcg/0.5 mL                                                        Branch

 

     FLUZONE      2017      Yes                      IMMUNIZATI           Univ

ers



     HIGH-DOSE      0-16                               ON GIVEN           ity of



     ,      00:00:                                              [place]



     PF, 180      00                                                Medical



     mcg/0.5 mL                                                        Branch

 

     FLUZONE      2017      Yes                      IMMUNIZATI           Univ

ers



     HIGH-DOSE      0-16                               ON GIVEN           ity of



     ,      00:00:                                              [place]



     PF, 180      00                                                Medical



     mcg/0.5 mL                                                        Branch

 

     FLUZONE      2017      Yes                      IMMUNIZATI           Univ

ers



     HIGH-DOSE      0-16                               ON GIVEN           ity of



     ,      00:00:                                              [place]



     PF, 180      00                                                Medical



     mcg/0.5 mL                                                        Branch

 

     FLUZONE      2017      Yes                      IMMUNIZATI           Univ

ers



     HIGH-DOSE      0-16                               ON GIVEN           ity of



     ,      00:00:                                              [place]



     PF, 180      00                                                Medical



     mcg/0.5 mL                                                        Branch

 

     FLUZONE      2017      Yes                      IMMUNIZATI           Univ

ers



     HIGH-DOSE      0-16                               ON GIVEN           ity of



     ,      00:00:                                              [place]



     PF, 180      00                                                Medical



     mcg/0.5 mL                                                        Branch

 

     FLUZONE      2017      Yes                      IMMUNIZATI           Univ

ers



     HIGH-DOSE      0-16                               ON GIVEN           ity of



     ,      00:00:                                              [place]



     PF, 180      00                                                Medical



     mcg/0.5 mL                                                        Branch

 

     FLUZONE      2017      Yes                      IMMUNIZATI           Univ

ers



     HIGH-DOSE      0-16                               ON GIVEN           ity of



     ,      00:00:                                              [place]



     PF, 180      00                                                Medical



     mcg/0.5 mL                                                        Branch

 

     FLUZONE      2017      Yes                      IMMUNIZATI           Univ

ers



     HIGH-DOSE      0-16                               ON GIVEN           ity of



     ,      00:00:                                              [place]



     PF, 180      00                                                Medical



     mcg/0.5 mL                                                        Branch

 

     FLUZONE      2017      Yes                      IMMUNIZATI           Univ

ers



     HIGH-DOSE      0-16                               ON GIVEN           ity of



     ,      00:00:                                              [place]



     PF, 180      00                                                Medical



     mcg/0.5 mL                                                        Branch

 

     FLUZONE      2017      Yes                      IMMUNIZATI           Univ

ers



     HIGH-DOSE      0-16                               ON GIVEN           ity of



     ,      00:00:                                              [place]



     PF, 180      00                                                Medical



     mcg/0.5 mL                                                        Branch

 

     FLUZONE      2017      Yes                      IMMUNIZATI           Univ

ers



     HIGH-DOSE      0-16                               ON GIVEN           ity of



     ,      00:00:                                              [place]



     PF, 180      00                                                Medical



     mcg/0.5 mL                                                        Branch

 

     FLUZONE      2017      Yes                      IMMUNIZATI           Univ

ers



     HIGH-DOSE      0-16                               ON GIVEN           ity of



     ,      00:00:                                              [place]



     PF, 180      00                                                Medical



     mcg/0.5 mL                                                        Branch

 

     FLUZONE      2017      Yes                      IMMUNIZATI           Univ

ers



     HIGH-DOSE      0-16                               ON GIVEN           ity of



     ,      00:00:                                              [place]



     PF, 180      00                                                Medical



     mcg/0.5 mL                                                        Branch

 

     Vitamin D Vitamin D           No             1{capsu QD   Vitamin D        

   



     50 MCG 50 MCG                          le}       50 MCG           



     ( UT) ( UT)                                    (2000)           

 

     Multivitami Multivitami           No             1{table QD   Multivitam   

        



     n Adult - n Adult -                          t}        in Adult -          

 

 

     Divalproex Divalproex           No             1{capsu QD   Divalproex     

      



     Sodium 125 Sodium 125                          le}       Sodium 125        

   



     MG   MG                                      MG             

 

     Rivastigmin Rivastigmin           No             1{patch QD   Rivastigmi   

        



     e 13.3 e 13.3                          _to_ski      ne 13.3           



     MG/24HR MG/24HR                          n}        MG/24HR           

 

     Vitamin D Vitamin D           No             1{capsu QD   Vitamin D        

   



     50 MCG 50 MCG                          le}       50 MCG           



     (2000) (2000)                                    (2000)           

 

     Multivitami Multivitami           No             1{table QD   Multivitam   

        



     n Adult - n Adult -                          t}        in Adult -          

 

 

     Divalproex Divalproex           No             1{capsu QD   Divalproex     

      



     Sodium 125 Sodium 125                          le}       Sodium 125        

   



     MG   MG                                      MG             

 

     Rivastigmin Rivastigmin           No             1{patch QD   Rivastigmi   

        



     e 13.3 e 13.3                          _to_ski      ne 13.3           



     MG/24HR MG/24HR                          n}        MG/24HR           

 

     Rivastigmin Rivastigmin           No             1{patch QD   Rivastigmi   

        



     e 13.3 e 13.3                          _to_ski      ne 13.3           



     MG/24HR MG/24HR                          n}        MG/24HR           

 

     Divalproex Divalproex           No             1{capsu QD   Divalproex     

      



     Sodium 125 Sodium 125                          le}       Sodium 125        

   



     MG   MG                                      MG             

 

     Vitamin D Vitamin D           No             1{capsu QD   Vitamin D        

   



     50 MCG 50 MCG                          le}       50 MCG           



     (2000) (2000)                                    (2000)           

 

     Multivitami Multivitami           No             1{table QD   Multivitam   

        



     n Adult - n Adult -                          t}        in Adult -          

 

 

     Multivitami Multivitami           No             1{table QD   Multivitam   

        



     n Adult - n Adult -                          t}        in Adult -          

 

 

     Vitamin D Vitamin D           No             1{capsu QD   Vitamin D        

   



     50 MCG 50 MCG                          le}       50 MCG           



     (2000) (2000)                                    (2000)           

 

     Rivastigmin Rivastigmin           No             1{patch QD   Rivastigmi   

        



     e 13.3 e 13.3                          _to_ski      ne 13.3           



     MG/24HR MG/24HR                          n}        MG/24HR           

 

     Divalproex Divalproex           No             1{capsu QD   Divalproex     

      



     Sodium 125 Sodium 125                          le}       Sodium 125        

   



     MG   MG                                      MG             

 

     Vitamin D Vitamin D           No             1{capsu QD   Vitamin D        

   



     50 MCG 50 MCG                          le}       50 MCG           



     (2000) (2000)                                    ( [place])           

 

     Multivitami Multivitami           No             1{table QD   Multivitam   

        



     n Adult - n Adult -                          t}        in Adult -          

 

 

     Divalproex Divalproex           No             1{capsu QD   Divalproex     

      



     Sodium 125 Sodium 125                          le}       Sodium 125        

   



     MG   MG                                      MG             

 

     Rivastigmin Rivastigmin           No             1{patch QD   Rivastigmi   

        



     e 13.3 e 13.3                          _to_ski      ne 13.3           



     MG/24HR MG/24HR                          n}        MG/24HR           

 

     Vitamin D Vitamin D           No             1{capsu QD   Vitamin D        

   



     50 MCG 50 MCG                          le}       50 MCG           



     (2000) (2000)                                    (2000)           

 

     Multivitami Multivitami           No             1{table QD   Multivitam   

        



     n Adult - n Adult -                          t}        in Adult -          

 

 

     Divalproex Divalproex           No             1{capsu QD   Divalproex     

      



     Sodium 125 Sodium 125                          le}       Sodium 125        

   



     MG   MG                                      MG             

 

     Rivastigmin Rivastigmin           No             1{patch QD   Rivastigmi   

        



     e 13.3 e 13.3                          _to_ski      ne 13.3           



     MG/24HR MG/24HR                          n}        MG/24HR           

 

     Vitamin D Vitamin D           No             1{capsu QD   Vitamin D        

   



     50 MCG 50 MCG                          le}       50 MCG           



     (2000) (2000)                                    (2000)           

 

     Multivitami Multivitami           No             1{table QD   Multivitam   

        



     n Adult - n Adult -                          t}        in Adult -          

 

 

     Memantine Memantine           No             1{table QD   Memantine        

   



     HCl 5 MG HCl 5 MG                          t}        HCl 5 MG           

 

     Memantine Memantine           No             1{table QD   Memantine        

   



     HCl 5 MG HCl 5 MG                          t}        HCl 5 MG           

 

     Vitamin D Vitamin D           No             1{capsu QD   Vitamin D        

   



     50 MCG 50 MCG                          le}       50 MCG           



     (2000) (2000)                                    (2000)           

 

     Multivitami Multivitami           No             1{table QD   Multivitam   

        



     n Adult - n Adult -                          t}        in Adult -          

 







Immunizations







           Ordered    Filled Immunization Date       Status     Comments   Sourc

e



           Immunization Name Name                                        

 

           FLUZONE HIGH DOSE FLUZONE HIGH DOSE 2022-10-14 Completed             

[facility] -



           OVER 65    OVER 65    09:24:00                         [facility]

 

           FLUZONE HIGH DOSE FLUZONE HIGH DOSE 2022-10-14 Completed             

[facility] -



           OVER 65    OVER 65    09:24:00                         [facility]

 

           FLUZONE HIGH DOSE FLUZONE HIGH DOSE 2021 Completed             

[facility] -



           OVER 65    OVER 65    11:47:00                         [facility]

 

           FLUZONE HIGH DOSE FLUZONE HIGH DOSE 2021 Completed             

[facility] -



           OVER 65    OVER 65    11:47:00                         [facility]

 

           FLUZONE HIGH DOSE FLUZONE HIGH DOSE 2021 Completed             

[facility] -



           OVER 65    OVER 65    11:47:00                         [facility]

 

           FLUZONE HIGH DOSE FLUZONE HIGH DOSE 2021 Completed             

[facility] -



           OVER 65    OVER 65    11:47:00                         [facility]

 

           FLUZONE HIGH DOSE FLUZONE HIGH DOSE 2021 Completed             

[facility] -



           OVER 65    OVER 65    11:47:00                         [facility]

 

           FLUZONE HIGH DOSE FLUZONE HIGH DOSE 2021 Completed             

[facility] -



           OVER 65    OVER 65    11:47:00                         [facility]

 

           FLUZONE HIGH DOSE FLUZONE HIGH DOSE 2021 Completed             

[facility] -



           OVER 65    OVER 65    11:47:00                         [facility]

 

           FLUZONE HIGH DOSE FLUZONE HIGH DOSE 2021 Completed             

[facility] -



           OVER 65    OVER 65    11:47:00                         [facility]

 

           SARS-COV-2 COVID-19            2021 Completed             Unive

rsity of



           MODERNA 12+ YRS            00:00:00                         [place] [facility]

ical



           VACCINE                                                Branch

 

           SARS-COV-2 COVID-19            2021 Completed             Unive

rsity of



           MODERNA 12+ YRS            00:00:00                         [place] [facility]

ical



           VACCINE                                                Branch

 

           SARS-COV-2 COVID-19            2021 Completed             Unive

rsity of



           MODERNA 12+ YRS            00:00:00                         [place] [facility]

ical



           VACCINE                                                Branch

 

           SARS-COV-2 COVID-19            2021 Completed             Unive

rsity of



           MODERNA 12+ YRS            00:00:00                         [place] [facility]

ical



           VACCINE                                                Branch

 

           SARS-COV-2 COVID-19            2021 Completed             Unive

rsity of



           MODERNA 12+ YRS            00:00:00                         [place] [facility]

ical



           VACCINE                                                Branch

 

           SARS-COV-2 COVID-19            2021 Completed             Unive

rsity of



           MODERNA 12+ YRS            00:00:00                         [place] [facility]

ical



           VACCINE                                                Branch

 

           SARS-COV-2 COVID-19            2021 Completed             Unive

rsity of



           MODERNA 12+ YRS            00:00:00                         [place] [facility]

ical



           VACCINE                                                Branch

 

           SARS-COV-2 COVID-19            2021 Completed             Unive

rsity of



           MODERNA 12+ YRS            00:00:00                         [place] [facility]

ical



           VACCINE                                                Branch

 

           SARS-COV-2 COVID-19            2021 Completed             Unive

rsity of



           MODERNA 12+ YRS            00:00:00                         [place] [facility]

ical



           VACCINE                                                Branch

 

           SARS-COV-2 COVID-19            2021 Completed             Unive

rsity of



           MODERNA 12+ YRS            00:00:00                         [place] [facility]

ical



           VACCINE                                                Branch

 

           SARS-COV-2 COVID-19            2021 Completed             Unive

rsity of



           MODERNA 12+ YRS            00:00:00                         [place] [facility]

ical



           VACCINE                                                Branch

 

           SARS-COV-2 COVID-19            2021 Completed             Unive

rsity of



           MODERNA 12+ YRS            00:00:00                         [place] [facility]

ical



           VACCINE                                                Branch

 

           SARS-COV-2 COVID-19            2021 Completed             Unive

rsity of



           MODERNA 12+ YRS            00:00:00                         [place] [facility]

ical



           VACCINE                                                Branch

 

           SARS-COV-2 COVID-19            2021 Completed             Unive

rsity of



           MODERNA 12+ YRS            00:00:00                         [place] [facility]

ical



           VACCINE                                                Branch

 

           SARS-COV-2 COVID-19            2021 Completed             Unive

rsity of



           MODERNA 12+ YRS            00:00:00                         [place] [facility]

ical



           VACCINE                                                Branch

 

           SARS-COV-2 COVID-19            2021 Completed             Unive

rsity of



           MODERNA 12+ YRS            00:00:00                         [place] [facility]

ical



           VACCINE                                                Branch

 

           SARS-COV-2 COVID-19            2021 Completed             Unive

rsity of



           MODERNA 12+ YRS            00:00:00                         [place] [facility]

ical



           VACCINE                                                Branch

 

           SARS-COV-2 COVID-19            2021 Completed             Unive

rsity of



           MODERNA 12+ YRS            00:00:00                         [place] [facility]

ical



           VACCINE                                                Branch

 

           SARS-COV-2 COVID-19            2021 Completed             Unive

rsity of



           MODERNA 12+ YRS            00:00:00                         [place] [facility]

ical



           VACCINE                                                Branch

 

           SARS-COV-2 COVID-19            2021 Completed             Unive

rsity of



           MODERNA 12+ YRS            00:00:00                         [place] [facility]

ical



           VACCINE                                                Branch

 

           SARS-COV-2 COVID-19            2021 Completed             Unive

rsity of



           MODERNA 12+ YRS            00:00:00                         [place] [facility]

ical



           VACCINE                                                Branch

 

           SARS-COV-2 COVID-19            2021 Completed             Unive

rsity of



           MODERNA 12+ YRS            00:00:00                         [place] [facility]

ical



           VACCINE                                                Branch

 

           SARS-COV-2 COVID-19            2021 Completed             Unive

rsity of



           MODERNA 12+ YRS            00:00:00                         [place] [facility]

ical



           VACCINE                                                Branch

 

           SARS-COV-2 COVID-19            2021 Completed             Unive

rsity of



           MODERNA 12+ YRS            00:00:00                         [place] [facility]

ical



           VACCINE                                                Branch

 

           SARS-COV-2 COVID-19            2021 Completed             Unive

rsity of



           MODERNA 12+ YRS            00:00:00                         [place] [facility]

ical



           VACCINE                                                Branch

 

           SARS-COV-2 COVID-19            2021 Completed             Unive

rsity of



           MODERNA 12+ YRS            00:00:00                         [facility]

ical



           VACCINE                                                Branch

 

           SARS-COV-2 COVID-19            2021 Completed             Unive

rsity of



           MODERNA 12+ YRS            00:00:00                         [facility]

ical



           VACCINE                                                Branch

 

           SARS-COV-2 COVID-19            2021 Completed             Unive

rsity of



           MODERNA 12+ YRS            00:00:00                         [facility]

ical



           VACCINE                                                Branch

 

           SARS-COV-2 COVID-19            2021 Completed             Unive

rsity of



           MODERNA 12+ YRS            00:00:00                         [facility]

ical



           VACCINE                                                Branch

 

           SARS-COV-2 COVID-19            2021 Completed             Unive

rsity of



           MODERNA 12+ YRS            00:00:00                         [facility]

ical



           VACCINE                                                Branch

 

           SARS-COV-2 COVID-19            2021 Completed             Unive

rsity of



           MODERNA 12+ YRS            00:00:00                         [facility]

ical



           VACCINE                                                Branch

 

           SARS-COV-2 COVID-19            2021 Completed             Unive

rsity of



           MODERNA 12+ YRS            00:00:00                         [facility]

ical



           VACCINE                                                Branch

 

           SARS-COV-2 COVID-19            2021 Completed             Unive

rsity of



           MODERNA 12+ YRS            00:00:00                         [facility]

ical



           VACCINE                                                Branch

 

           SARS-COV-2 COVID-19            2021 Completed             Unive

rsity of



           MODERNA 12+ YRS            00:00:00                         [facility]

ical



           VACCINE                                                Branch

 

           SARS-COV-2 COVID-19            2021 Completed             Unive

rsity of



           MODERNA 12+ YRS            00:00:00                         [facility]

ical



           VACCINE                                                Branch

 

           SARS-COV-2 COVID-19            2021 Completed             Unive

rsity of



           MODERNA 12+ YRS            00:00:00                         [facility]

ical



           VACCINE                                                Branch

 

           SARS-COV-2 COVID-19            2021 Completed             Unive

rsity of



           MODERNA 12+ YRS            00:00:00                         [facility]

ical



           VACCINE                                                Branch

 

           SARS-COV-2 COVID-19            2021 Completed             Unive

rsity of



           MODERNA 12+ YRS            00:00:00                         [facility]

ical



           VACCINE                                                Branch

 

           SARS-COV-2 COVID-19            2021 Completed             Unive

rsity of



           MODERNA 12+ YRS            00:00:00                         [facility]

ical



           VACCINE                                                Branch

 

           SARS-COV-2 COVID-19            2021 Completed             Unive

rsity of



           MODERNA 12+ YRS            00:00:00                         [facility]

ical



           VACCINE                                                Branch

 

           SARS-COV-2 COVID-19            2021 Completed             Unive

rsity of



           MODERNA 12+ YRS            00:00:00                         [facility]

ical



           VACCINE                                                Branch

 

           SARS-COV-2 COVID-19            2021 Completed             Unive

rsity of



           MODERNA 12+ YRS            00:00:00                         [facility]

ical



           VACCINE                                                Branch

 

           SARS-COV-2 COVID-19            2021 Completed             Unive

rsity of



           MODERNA 12+ YRS            00:00:00                         [facility]

ical



           VACCINE                                                Branch

 

           SARS-COV-2 COVID-19            2021 Completed             Unive

rsity of



           MODERNA 12+ YRS            00:00:00                         [place] [facility]

ical



           VACCINE                                                Branch

 

           SARS-COV-2 COVID-19            2021 Completed             Unive

rsity of



           MODERNA 12+ YRS            00:00:00                         [facility]

ical



           VACCINE                                                Branch

 

           SARS-COV-2 COVID-19            2021 Completed             Unive

rsity of



           MODERNA 12+ YRS            00:00:00                         [facility]

ical



           VACCINE                                                Branch

 

           SARS-COV-2 COVID-19            2021 Completed             Unive

rsity of



           MODERNA 12+ YRS            00:00:00                         [place] [facility]

ical



           VACCINE                                                Branch

 

           SARS-COV-2 COVID-19            2021 Completed             Unive

rsity of



           MODERNA 12+ YRS            00:00:00                         [place] [facility]

ical



           VACCINE                                                Branch

 

           Prevnar 13 Prevnar 13 2018 Completed             [facility] 

-



           -Pneumonia Vaccine -Pneumonia Vaccine 15:06:00                       

  [facility]

 

           Prevnar 13 Prevnar 13 2018 Completed             [facility] 

-



           -Pneumonia Vaccine -Pneumonia Vaccine 15:06:00                       

  [facility]

 

           Prevnar 13 Prevnar 13 2018 Completed             [facility] 

-



           -Pneumonia Vaccine -Pneumonia Vaccine 15:06:00                       

  [facility]

 

           Prevnar 13 Prevnar 13 2018 Completed             [facility] 

-



           -Pneumonia Vaccine -Pneumonia Vaccine 15:06:00                       

  [facility]

 

           Prevnar 13 Prevnar 13 2018 Completed             [facility] 

-



           -Pneumonia Vaccine -Pneumonia Vaccine 15:06:00                       

  [facility]

 

           Prevnar 13 Prevnar 13 2018 Completed             [facility] 

-



           -Pneumonia Vaccine -Pneumonia Vaccine 15:06:00                       

  [facility]

 

           Prevnar 13 Prevnar 13 2018 Completed             [facility] 

-



           -Pneumonia Vaccine -Pneumonia Vaccine 15:06:00                       

  [facility]

 

           Prevnar 13 Prevnar 13 2018 Completed             [facility] 

-



           -Pneumonia Vaccine -Pneumonia Vaccine 15:06:00                       

  [facility]

 

           Prevnar 13 Prevnar 13 2018 Completed             [facility] 

-



           -Pneumonia Vaccine -Pneumonia Vaccine 00:00:00                       

  [facility]







Vital Signs







             Vital Name   Observation Time Observation Value Comments     Source

 

             height       2022-10-26 16:00:00 68 [in_i]                 [facility]

 

             weight       2022-10-26 16:00:00 145 [lb_av]               [facility]

 

             bmi          2022-10-26 16:00:00 22.04 kg/m2               [facility]

 

             height       2022 11:00:00 68 [in_i]                 [facility]

 

             weight       2022 11:00:00 146 [lb_av]               [facility]

 

             temperature  2022 11:00:00 97.1 [degF]               [facility]

 

             bmi          2022 11:00:00 22.2 kg/m2                [facility]

 

             oximetry     2022 11:00:00 100 %                     [facility]

 

             respiratory rate 2022 11:00:00 18 /min                   Comm

on [facility]

 

             blood pressure 2022 11:00:00 131 mm[Hg]                [facility]



             systolic                                            [facility]

 

             blood pressure 2022 11:00:00 63 mm[Hg]                 [facility]



             diastolic                                           [facility]

 

             height       2021 11:40:00 68 [in_i]                 [facility]

 

             weight       2021 11:40:00 161 [lb_av]               [facility]

 

             temperature  2021 11:40:00 98.2 [degF]               [facility]

 

             bmi          2021 11:40:00 24.48 kg/m2               [facility]







Procedures







                Procedure       Date / Time Performed Performing Clinician Sourc

e

 

                OP CORRESPONDENCE 2022-10-24 05:01:00 Doctor Unassigned, No Univ

ersity of [facility] - OTHER 2022 05:01:00 Doctor Unassigned, No Un

iversity of 

[facility] 2022-09-15 05:01:00 Doctor Unassigned, No Un

iversity of 

[facility]ALTH 485 2022-08-15 05:01:00 Doctor Unassigned, No Univer

sity of [facility]







Encounters







        Start   End     Encounter Admission Attending Care    Care    Encounter 

Source



        Date/Time Date/Time Type    Type    Clinicians Facility Department ID   

   

 

        2022         Outpatient         Marcela  [facility]  [id number]

 Common



        10:22:00                         Sheri                   [id number]   [facility]

 

        2022 Telephone         HARDIK Mahan    1.2.840.114 [id number]

[id number] [facility]



        00:00:00 00:00:00                 [facility]  350.1.13.10       

  itbaljit amador



                                                [place] [id number]         [place]

as



                                                WILLIS?BLEA [phone]         Me

keturah MOORE    2             [facility]



                                                MEDICAL                 



                                                OFFICE                  



                                                BUILDING                 

 

        2022 Outpatient ALISIA SHEFFIELD JIM [facility]    10

[id number] Univers



        14:00:00 14:08:55                 JIM SHEFFIELD                         i

ty of



                                                                        [facility]

 

        2022 Office          STEWART Sheffield [id number]

5421 [facility]



        14:00:00 14:08:55 Visit           Jim [facility] 350.1.13.10         i

ty of



                                                [facility] [id number]         [place]

[place]



                                                        [phone]         [address]

 

        2022 Telephone         [facility]    1.2.840.114 [id number]

[id number] Univers



        00:00:00 00:00:00                 [facility]  350.1.13.10       

  ity of



                                                [place] [id number]         [place]

as



                                                WILLIS?BLEA [phone]         [address]                 



                                                OFFICE                  



                                                [facility]                 

 

        2022 Telephone         [facility]    1.2.840.114 [id number]

[id number] Univers



        00:00:00 00:00:00                 [facility]  350.1.13.10       

  ity of



                                                [place] [id number]         [place]

as



                                                WILLIS?BLEA [phone]         [address]                 



                                                OFFICE                  



                                                [facility]                 

 

        2022-11-15 2022-11-15 Telephone         [facility]    1.2.840.114 [id number]

[id number] Univers



        00:00:00 00:00:00                 [facility]  350.1.13.10       

  ity of



                                                [place] [id number]         [place]

as



                                                WILLIS?BLEA [phone]         [address]                 



                                                OFFICE                  



                                                [facility]                 

 

        2022 Telephone         [facility]    1.2.840.114 [id number]

[id number] Univers



        00:00:00 00:00:00                 [facility]  350.1.13.10       

  ity of



                                                [place] [id number]         [place]

as



                                                WILLIS?BLEA [phone]         [address]                 



                                                OFFICE                  



                                                [facility]                 

 

        2022 Telephone         [facility]    1.2.840.114 [id number]

[id number] Univers



        00:00:00 00:00:00                 [facility]  350.1.13.10       

  ity of



                                                [place] [id number]         [place]

as



                                                WILLIS?BLEA [phone]         Me

keturah [address]                 



                                                OFFICE                  



                                                [facility]                 

 

        2022 Telephone         [facility]    1.2.840.114 [id number]

[id number] Univers



        00:00:00 00:00:00                 [facility]  350.1.13.10       

  ity of



                                                [place] [id number]         [place]

as



                                                WILLIS?BLEA [phone]         [address]                 

 

        2022-10-31 2022-10-31 Telephone         [facility]    1.2.840.114 [id number]

[id number] Univers



        00:00:00 00:00:00                 [facility]  350.1.13.10       

  ity of



                                                [place] [id number]         [place]

as



                                                WILLIS?BLEA [phone]         [address]                 

 

        2022-10-27 2022-10-27 Telephone         [facility]    1.2.840.114 [id number]

[id number] Univers



        00:00:00 00:00:00                 [facility]  350.1.13.10       

  ity of



                                                [place] [id number]         [place]

as



                                                WILLIS?BLEA [phone]         [address]                 

 

        2022-10-26 2022-10-26 SUB ANNUAL                 [facility]  [id number]

 Common



        00:00:00 00:00:00 MCR                                             [facility]



                        WELLNESS                                         - [facility]



                        VISIT                                           [facility]

 

        2022-10-25 2022-10-25 Telephone         [facility]    1.2.840.114 [id number]

[id number] Univers



        00:00:00 00:00:00                 [facility]  350.1.13.10       

  ity of



                                                [place] [id number]         [place]

as



                                                WILLIS?BLEA [phone]         [address]                 

 

        2022-10-24 2022-10-24 Orders          Doctor [place]    1.2.840.114 [id number]

30 



        00:00:00 00:00:00 Only            Unassigned, PALLAVI   350.1.13.10       

  ity of



                                        [facility] [id number]         [place]

as



                                                        [phone]         [address]

 

        2022-10-19 2022-10-19 Telephone         [facility]    1.2.840.114 [id number]

[id number] Univers



        00:00:00 00:00:00                 [facility] 350.1.13.10       

  ity of



                                                CARE    [id number]         [place]

[place]



                                                PAVILLION [phone]         [address]

 

        2022-10-18 2022-10-18 Telephone         [facility]    1.2.840.114 [id number]

[id number] Univers



        00:00:00 00:00:00                 [facility]  350.1.13.10       

  ity of



                                                [facility] [id number]         [place]

as



                                                WILLIS?BLEA [phone]         [address]



                                                MEDICAL                 



                                                OFFICE                  



                                                [facility]                 

 

        2022-10-14 2022-10-14 OFFICE                  [facility]  [id number] Co

mmon



        00:00:00 00:00:00 VISIT                                           [facility]



                        [facility] PT                                         - [facility]



                        LEVEL 2                                         [facility]

 

        2022-10-13 2022-10-13 Telephone         [facility]    1.2.840.114 [id number]

[id number] Univers



        00:00:00 00:00:00                 [facility] 350.1.13.10       

  ity of



                                                CARE    [id number]         [place]

[place]



                                                PAVILLION [phone]         [address]

 

        2022-10-10 2022-10-10 Telephone         [facility]    1.2.840.114 [id number]

[id number] Univers



        00:00:00 00:00:00                 [facility]  350.1.13.10       

  ity of



                                                [facility] [id number]         [place]

as



                                                WILLIS?BLEA [phone]         [address]



                                                MEDICAL                 



                                                OFFICE                  



                                                [facility]                 

 

        2022-10-10 2022-10-10 (TEL)                   [facility]  [id number] Co

mmon



        00:00:00 00:00:00                                                 [facility]



                                                                        [facility]

 

        2022 Orders          Doctor  VALENCIA    1.2.840.114 [id number]

85 Univers



        00:00:00 00:00:00 Only            Unassigned, PALLAVI   350.1.13.10       

  ity of



                                        [facility] [id number]         [place]

as



                                                        [phone]         [address]

 

        2022 OFFICE                  [facility]  [id number] Co

mmon



        00:00:00 00:00:00 VISIT EST                                         [facility]

it



                        PT LEVEL 3                                         - [facility]



                                                                        [facility]

 

        2022 Telephone         [facility]    1.2.840.114 [id number]

[id number] Univers



        00:00:00 00:00:00                 [facility]  350.1.13.10       

  ity of



                                                [facility] [id number]         [place]

as



                                                WILLIS?BLEA [phone]         [address]



                                                MEDICAL                 



                                                OFFICE                  



                                                [facility]                 

 

        2022-09-15 2022-09-15 Orders          Doctor  VALENCIA    1.2.840.114 [id number]

15 Univers



        00:00:00 00:00:00 Only            Unassigned, PALLAVI   350.1.13.10       

  ity of



                                        [facility] [id number]         [place]

as



                                                        [phone]         [address]

 

        2022 Telephone         [facility]    1.2.840.114 [id number]

[id number] Univers



        00:00:00 00:00:00                 [facility]  350.1.13.10       

  ity of



                                                [place] [id number]         [place]

as



                                                WILLIS?BLEA [phone]         [address]



                                                MEDICAL                 



                                                OFFICE                  



                                                [facility]                 

 

        2022 Patient         Miguel Angel  [facility]    1.2.840.114 [id number]

[id number] Univers



        00:00:00 00:00:00 Outreach         Angelita L [facility]  350.1.13.10         i

ty of



                                                [place] [id number]         [place]

as



                                                WILLIS?BLEA [phone]         [address]



                                                MEDICAL                 



                                                OFFICE                  



                                                BUILDING                 

 

        2022 Patient         Miguel Angel  [facility]    1.2.840.114 [id number]

01 Univers



        00:00:00 00:00:00 Outreach         [facility] L [facility]  350.1.13.10         i

ty of



                                                [place] [id number]         [place]

as



                                                WILLIS?BLEA [phone]         [address]                 



                                                OFFICE                  



                                                [facility]                 

 

        2022 Office          [facility]    1.2.840.114 [id number]

[id number] Univers



        10:40:00 13:01:52 Visit           [facility]  350.1.13.10       

  ity of



                                                Hilbert 4.2.7.2.686         [place]

as



                                                WILLIS?BLEA [phone]         [address]                 

 

        2022 Outpatient ZION DAUGHERTY [facility]   

 [id number] Univers



        10:40:00 13:01:52                 ZION MAHAN                       

  baljit [facility]

 

        2022 Outpatient R       ZION MAHAN [facility]   

 [id number] Univers



        10:40:00 10:40:00                 ZION MAHAN                       

  [facility]

 

        2022 Orders          Doctor  VALENCIA    1.2.840.114 [id number]

07 Univers



        00:00:00 00:00:00 Only            Unassigned, PALLAVI   350.1.13.10       

  ity of



                                        [facility] 4.2.7.2.686         [place]

as



                                                        [phone]         [address]

 

        2022 [facility]ill          [facility]    1.2.840.114 [id number]

021 Univers



        00:00:00 00:00:00                 [facility]  350.1.13.10       

  ity of



                                                [place] 4.2.7.2.686         [place]

as



                                                WILLIS?BLEA [phone]         [address]                 

 

        2022 Telephone         Kalli [facility]    1.2.840.114 [id number]

[id number] Univers



        00:00:00 00:00:00                 [facility]  350.1.13.10       

  ity of



                                                [place] 4.2.7.2.686         [place]

as



                                                WILLIS?BLEA [phone]         [address]                 

 

        2022 Telephone         [facility]    1.2.840.114 [id number]

[id number] Univers



        00:00:00 00:00:00                 [facility]  350.1.13.10       

  ity of



                                                [place] 4.2.7.2.686         [place]

as



                                                WILLIS?BLEA [phone]         [address]



                                                MEDICAL                 



                                                OFFICE                  



                                                BUILDING                 

 

        2022-08-15 2022-08-15 Orders          Doctor  VALENCIA    1.2.840.114 [id number]

93 Univers



        00:00:00 00:00:00 Only            Unassigned, PALLAVI   350.1.13.10       

  ity of



                                        [facility] 4.2.7.2.686         [place]

as



                                                        [phone]         Medi

sanjay



                                                        009             [place]

 

        2022 Office          STEWART [place] 1.2.875.537 [id number]

8369 Univers



        15:15:00 15:44:33 Visit           [facility] 350.1.13.10         i

ty of



                                                [facility] 4.2.7.2.686         [place]

[place]



                                                        [phone]         Medi

sanjay



                                                        028             [place]

 

        2022 Outpatient R       JIM SHEFFIELD [facility]    10

[id number] Univers



        15:15:00 15:44:33                 JIM SHEFFIELD i

ty of



                                                                        [facility]

 

        2022 Outpatient JIM IGLESIAS [facility]    10

[id number] Univers



        15:15:00 15:15:00                 JIM SHEFFIELD i

ty of



                                                                        [facility]

 

        2022 Outpatient R       NETTIE JIM [facility]    10

[id number] Univers



        15:15:00 15:15:00                 JIM SHEFFIELD i

ty of



                                                                        [facility]

 

        2022 Refill          [facility]    1.2.840.114 [id number]

193 Univers



        00:00:00 00:00:00                 [facility]  350.1.13.10       

  ity of



                                                [facility] 4.2.7.2.686         [place]

as



                                                WILLIS?BLEA [phone]         [address]



                                                MEDICAL                 



                                                OFFICE                  



                                                [facility]                 

 

        2022 Outpatient R       JIM SHEFFIELD [facility]    10

[id number] Univers



        14:00:00 14:00:00                 JIM SHEFFIELD                         i

ty of



                                                                        [facility]

 

        2022-02-15 2022-02-15 (TEL)                   [facility]  [id number] Co

mmon



        00:00:00 00:00:00                                                 Spirit



                                                                        - [facility]



                                                                        [facility]

 

        2021 Orders          Doctor  VALENCIA    1.2.840.114 [id number]

85 Univers



        00:00:00 00:00:00 Only            Unassigned, PALLAVI   350.1.13.10       

  ity of



                                        [facility] 4.2.7.2.686         [place]

as



                                                        [phone]         Medi

sanjay



                                                        009             [place]

 

        2021-11-10 2021-11-10 Michele Mahan [facility]    1.2.840.114 [id number]

[id number] Univers



        00:00:00 00:00:00                 Zion [facility] 350.1.13.10      

   ity of



                                                [place] 4.2.7.2.686         [place]

[place]



                                                PROFESSIO [phone]         [address]                 

 

        2021 OFFICE                  [facility]  [id number] Co

mmon



        00:00:00 00:00:00 VISIT                                           Augusto



                        ESTAB PT                                         - [facility]



                        LEVEL 1                                         [facility]

 

        2021-10-25 2021-10-25 Outpatient R       JIM SHEFFIELD [facility]    10

[id number] Univers



        14:30:00 14:54:02                 JIM SHEFFIELD                         i

ty of



                                                                        [facility]

 

        2021-10-25 2021-10-25 Office          STEWART [place] 1.2.484.737 [id number]

2519 Univers



        14:10:34 14:54:02 Visit           Jim [facility] 350.1.13.10         i

ty of



                                                [facility] 4.2.7.2.686         [place]

[place]



                                                        [phone]         Medi

sanjay



                                                        028             Branch

 

        2021-09-10 2021-09-10 Refedil Mahan [facility]    1.2.840.114 [id number]

821 Univers



        00:00:00 00:00:00                 Zion [facility]  350.1.13.10       

  ity of



                                                Vincent 4.2.7.2.686         [place]

as



                                                Willis?Blea [phone]         Me

dical



                                                pavithraey    [address] [place] [address]



                                                Medical                 



                                                Office                  



                                                Building                 

 

        2021 Office          [facility]    1.2.840.114 [id number]

131 Univers



        10:22:10 13:17:22 Visit           Zion Meléndez [facility]  350.1.13.10       

  ity of



                                                Vincent 4.2.7.2.686         [place]

as



                                                Willis?Blea [phone]         Me

keturah moore    [address] [place] [address]                 

 

        2021 Outpatient ZION DAUGHERTY [facility]   

 [id number] Univers



        10:40:00 10:40:00                 ZION MAHAN [facility]

 

        2021 Outpatient                 [facility]  [id number]

 Common



        00:00:00 00:00:00                                                 [facility]

 

        2021-08-10 2021-08-10 [facility]    1.2.840.114 [id number]

238 Univers



        00:00:00 00:00:00                 Zion Meléndez Vincent 350.1.13.10      

   ity of



                                                [place] 4.2.7.2.686         [place]

[place]



                                                Professio [phone]         Me

keturah mcdonnell     [address] [place] [address]                 

 

        2021-08-10 2021-08-10 Refill          [facility]    1.2.840.114 [id number]

914 Univers



        00:00:00 00:00:00                 Zion Gandhiton 350.1.13.10      

   ity of



                                                [place] 4.2.7.2.686         [place]

[place]



                                                Professio [phone]         Me

majoral



                                                sathya     2             [facility]                 

 

        2021 Office          STEWART Garcia [id number] [id number]

2914 Univers



        12:46:02 13:16:02 Visit           Nettie GALINDO       350.1.13.10         it

y of



                                                [facility]ATIONAL 4.2.7.2.686         [place]

as



                                                BANK    [phone]         Medi

sanjay



                                                BLDG.   144             [place]

 

        2021 Outpatient ALISIA GARCIA  [facility]    [id number]

[id number] Univers



        13:00:00 13:00:00                 NETTIE peter [facility]

 

        2021 Telephone         [facility]    1.2.840.114 [id number]

[id number] Univers



        00:00:00 00:00:00                 Zion Meléndez Aleida 350.1.13.10      

   ity of



                                                [place] 4.2.7.2.686         [place]

[place]



                                                Professio [phone]         Me

dical



                                                nal     092             [facility]                 

 

        2021-07-15 2021-07-15 [facility]         [facility]    1.2.389.393 [id number]

7707 Univers



        09:53:57 23:59:00 Encounter         Zion Meléndez Aleida 350.1.13.10    

     ity of



                                                [place] 4.2.7.2.686         [place]

[place]



                                                [place]  [phone]         Medi

sanjay



                                                        804             [place]

 

        2021-07-15 2021-07-15 Outpatient ZION DAUGHERTY [facility]   

 [id number] Univers



        00:00:00 00:00:00                 KALLI ZION peter [facility]

 

        2021-07-15 2021-07-15 Telephone         [facility]    1.2.840.114 [id number]

[id number] Univers



        00:00:00 00:00:00                 Zion Shin 350.1.13.10      

   ity of



                                                [place] 4.2.7.2.686         [place]

[place]



                                                Professio [phone]         Me

dical



                                                nal     [address] [place] [address]                 

 

        2021 [facility]    1.2.840.114 [id number]

733 Univers



        08:06:15 09:22:13 Visit           Zion Meléndez Aleida 350.1.13.10      

   ity of



                                                [place] 4.2.7.2.686         [place]

s



                                                Professio [phone]         Me

dical



                                                nal     [address] [place] [address]                 

 

        2021 Outpatient ALISIA WHALEYBEATRICE ZION [facility]   

 [id number] Univers



        08:00:00 08:00:00                 KALLIZION BARRON                       

  rome [facility]

 

        2021 Orders          Doctor [place]    1.2.840.114 [id number]

34 Univers



        00:00:00 00:00:00 Only            Unassigned, PALLAVI   350.1.13.10       

  ity of



                                        [facility] 4.2.7.2.686         [place]

as



                                                        [phone]         Medi

[address]

 

        2021 Orders          Doctor  VALENCIA    1.2.840.114 [id number]

44 Univers



        00:00:00 00:00:00 Only            Unassigned, PALLAVI   350.1.13.10       

  ity of



                                        [facility] 4.2.7.2.686         [place]

as



                                                        [phone]         [address]

 

        2021 Outpatient                 [facility]  [facility]  [id number]

 Common



        00:00:00 00:00:00                                                 [facility]

 

        2021 Outpatient                 [facility]  [facility]  [id number]

 Common



        00:00:00 00:00:00                                                 [facility]

 

        2021 Orders          Doctor  VALENCIA Kaur2.840.114 [id number]

17 Univers



        00:00:00 00:00:00 Only            Unassigned, PALLAVI   350.1.13.10       

  ity of



                                        [facility] 4.2.7.2.686         [place]

as



                                                        [phone]         [address]

 

        2021 Orders          Doctor  VALENCIA    1.2.840.114 [id number]

15 Univers



        00:00:00 00:00:00 Only            Unassigned, PALLAVI   350.1.13.10       

  ity of



                                        [facility] 4.2.7.2.686         [place]

as



                                                        [phone]         [address]

 

        2021 Outpatient                 [facility]  [facility]  [id number]

 Common



        00:00:00 00:00:00                                                 [facility]

 

        2021 Orders          Doctor VALENCIA Kaur2.840.114 [id number]

13 Univers



        00:00:00 00:00:00 Only            Unassigned, PALLAVI   350.1.13.10       

  ity of



                                        [facility] 4.2.7.2.686         [place]

as



                                                        [phone]         [address]

 

        2021 Outpatient R       MARBELLA  [facility]    [id number]

689 Univers



        16:15:00 16:15:00                 ELLY                           itbaljit [facility]

 

        2021 Gatito Tyson  [facility]    1.2.840.114 [id number]

54 Univers



        15:50:12 16:05:12 Visit           Elly MOCTEZUMA [facility]  350.1.13.10         it

y of



                                                Surgical 4.2.7.2.686         [place]

as



                                                Specialti [phone]         Me

dical



                                                es      198             [facility]                 

 

        2021 Outpatient                 [facility]    [id number]

500 Univers



        12:10:00 12:10:00                                                 ity of



                                                                        [facility]

 

        2021 Outpatient                 [facility]    [id number]

387 Univers



        12:15:00 12:15:00                                                 ity of



                                                                        [facility]

 

        2020-10-26 2020-10-26 Office          STEWART [place] 1.2.358.476 [id number]

9089 Univers



        14:04:21 14:55:02 Visit           Jim GALINDO [facility] 350.1.13.10         i

ty of



                                                CLINICS 4.2.7.2.686         [place]

[place]



                                                        [phone]         Medi

sanjay



                                                        028             [place]

 

        2020-10-26 2020-10-26 Outpatient ALISIA SHEFFIELD JIM [facility]    10

[id number] Univers



        14:00:00 14:00:00                 JIM SHEFFIELD                         i

ty of



                                                                        [facility]

 

        2020-10-26 2020-10-26 Outpatient ALISIA SHEFFIELD JIM [facility]    10

[id number] Univers



        14:00:00 14:00:00                 JIM SHEFFIELD i

ty of



                                                                        [facility]

 

        2020 Outpatient                 [facility] 31

[id number] Common



        15:00:00 15:00:00                         [facility]

it



                                                Road    [facility]

 

        2020 Outpatient                 [facility] 26

[id number] Common



        11:00:00 11:00:00                         [facility]

it



                                                Road    [facility]

 

        2020 [facility]    1.2.840.114 [id number]

[id number] Univers



        12:43:00 23:59:00 Encounter         Eduardo SHANE Aleida 350.1.13.10        

 ity of



                                                [place] 4.2.7.2.686         [place]

s



                                                [place]  [phone]         Medi

sanjay



                                                        807             [place]

 

        2020 Outpatient ALISIA TYSON  [facility]    [id number]

713 Univers



        13:45:00 13:45:00                 ELLY                           ity [facility]

 

        2020 Office          Marbella  [facility]    1.2.840.114 [id number]

86 Univers



        13:12:43 13:27:43 Visit           Elly MOCTEZUMA [facility]  350.1.13.10         it

y of



                                                Surgical 4.2.7.2.686         [place]

as



                                                Specialti [phone]         Me

dical



                                                es      198             [facility]                 

 

        2020 Telephone         Franco [facility]    1.2.840.114 [id number]

[id number] Univers



        00:00:00 00:00:00                 Eduardo [facility]  350.1.13.10         it

y of



                                                Surgical 4.2.7.2.686         [place]

as



                                                Specialti [phone]         Me

dical



                                                es      198             [facility]                 

 

        2020 Orders          Doctor [place]    1.2.840.114 [id number]

54 Univers



        00:00:00 00:00:00 Only            Unassigned, PALLAVI   350.1.13.10       

  ity of



                                        [facility] 4.2.7.2.686         [place]

as



                                                        [phone]         [address]

 

        2019 Outpatient                 [facility] 26

[id number] Common



        16:40:00 16:40:00                         [facility]

it



                                                Road    [facility]

 

        2019 Outpatient                 [facility] [place] 26

[id number] Common



        14:56:00 14:56:00                         t [facility]

it



                                                Road    [facility]

 

        2019 Outpatient                 [facility] [place] 24

[id number] Common



        09:15:00 09:15:00                         t [facility]

it



                                                Road    [facility]

 

        2019 Outpatient                 [facility] [place] 23

[id number] Common



        10:30:00 10:30:00                         t [facility]         Spir

it



                                                Road    [facility]

 

        2018 Outpatient                 [facility] [place] 14

[id number] Common



        10:15:00 10:15:00                         t       Specialty/U         Sp

paul



                                                Specialty rology          - 



                                                /Urology Clinic          [facility]

 

        2018 Outpatient                 Ricco [place]varado 14

[id number] Common



        10:02:00 10:02:00                         [facility]

 

        2018 Outpatient                 Ricco [place]varado 14

[id number] Common



        14:30:00 14:30:00                         [facility]







Results

This patient has no known results.

## 2022-12-21 NOTE — ER
Nurse's Notes                                                                                     

 [facility]                                                                 

Name: Francisco Reagan                                                                           

Age: 72 yrs                                                                                       

Sex: Male                                                                                         

: 1950                                                                                   

MRN: [id number]                                                                                   

Arrival Date: 2022                                                                          

Time: 09:31                                                                                       

Account#: [id number]                                                                            

Bed 20                                                                                            

Private MD:                                                                                       

Diagnosis: Dehydration                                                                            

                                                                                                  

Presentation:                                                                                     

                                                                                             

09:32 Chief complaint: EMS states: Pt from home, wife states that she is concerned that he    ph  

      may have a UTI. Reports that urine has been foul smelling and that pt has been more         

      lethargic than normal, hx of Alzheimer's/dementia, oriented to person only at baseline.     

      Coronavirus screen: Vaccine status: Patient reports receiving the 2nd dose of the covid     

      vaccine. Ebola Screen: No symptoms or risks identified at this time. Initial Sepsis         

      Screen: Does the patient meet any 2 criteria? No. Patient's initial sepsis screen is        

      negative. Does the patient have a suspected source of infection? Yes:                       

      Dysuria/Frequency/Urgency/UTI. Risk Assessment: Do you want to hurt yourself or someone     

      else? Patient reports no desire to harm self or others. Onset of symptoms was 2022.                                                                                   

09:32 Method Of Arrival: EMS: [facility]                                                      

09:32 Acuity: GAURAV 3                                                                           ph  

                                                                                                  

Triage Assessment:                                                                                

09:34 General: Appears in no apparent distress. well groomed, Behavior is quiet. Pain: Denies ph  

      pain. Neuro: Level of Consciousness is awake, alert, Oriented to person.                    

      Cardiovascular: Capillary refill < 3 seconds in bilateral fingers Patient's skin is         

      warm and dry. Respiratory: Airway is patent Respiratory effort is even, unlabored. :      

      Parent/caregiver report the patient having foul smelling urine. Derm: Skin is fragile,      

      is thin, Skin is pink, warm \T\ dry. Musculoskeletal: Circulation, motion, and sensation    

      intact. Range of motion: intact in all extremities.                                         

                                                                                                  

Historical:                                                                                       

- Allergies:                                                                                      

09:34 No Known Allergies;                                                                     ph  

- PMHx:                                                                                           

09:34 Dementia;                                                                               ph  

                                                                                                  

- Immunization history:: Adult Immunizations unknown.                                             

- Social history:: Smoking status: unknown.                                                       

- Family history:: not pertinent.                                                                 

- Unable to obtain history due to: baseline dementia.                                             

                                                                                                  

                                                                                                  

Screenin:36 [facility] ED Fall Risk Assessment (Adult) History of falling in the last 3 months,       ph  

      including since admission Yes- single mechanical fall (1 pt) Confusion or                   

      Disorientation Yes (5 pts) Intoxicated or Sedated No (0 pts) Impaired Gait No (0 pts)       

      Mobility Assist Device Used No (0 pt) Altered Elimination Yes (1 pt) Score/Fall Risk        

      Level 3 or more points = High Risk Oriented to surroundings, Maintained a safe              

      environment, Hourly rounding (assess needs \T\ fall precautionary measures) done, Used      

      ambulatory aids as needed (educated on \T\ assisted with), Utilized family, sitter, or      

      virtual  as indicated. Abuse screen: Denies threats or abuse. Denies injuries      

      from another. Nutritional screening: No deficits noted. Tuberculosis screening: No          

      symptoms or risk factors identified.                                                        

                                                                                                  

Assessment:                                                                                       

10:21 General: SEE TRIAGE ASSESSMENT.                                                         ph  

11:09 Reassessment: Patient appears in no apparent distress at this time. Patient and/or      ph  

      family updated on plan of care and expected duration. Pain level reassessed. Pt awake       

      and alert.                                                                                  

                                                                                                  

Vital Signs:                                                                                      

09:32  / 92; Pulse 70; Resp 18; Pulse Ox 100% on R/A; Weight 81.65 kg; Height 5 ft. 10  ph  

      in. (177.80 cm);                                                                            

10:19 Temp 97.8(TE);                                                                          ph  

11:06  / 91; Pulse 69; Resp 18; Pulse Ox 98% on R/A;                                    ph  

09:32 Body Mass Index 25.83 (81.65 kg, 177.80 cm)                                             ph  

                                                                                                  

ED Course:                                                                                        

09:31 Patient arrived in ED.                                                                  ph  

09:32 Dayday Calle MD is Attending Physician.                                            rt  

09:34 Triage completed.                                                                       ph  

09:34 Arm band placed on Patient placed in an exam room.                                      ph  

09:36 Patient has correct armband on for positive identification. Placed in gown. Bed in low  ph  

      position. Call light in reach. Pulse ox on. NIBP on.                                        

09:52 Lissa Shahid RN is Primary Nurse.                                                    ph  

09:52 Initial lab(s) drawn, by ED staff, sent to lab. Inserted saline lock: 22 gauge in right ph  

      antecubital area, using aseptic technique. Blood collected.                                 

09:58 Troponin High Sensitivity Sent.                                                         em1 

09:58 CPK Sent.                                                                               em1 

09:58 Magnesium Sent.                                                                         em1 

09:58 CMP Sent.                                                                               em1 

09:58 CBC with Diff Sent.                                                                     em1 

11:26 Straight cath inserted, using sterile technique, 16 Fr. Specimen obtained. Returned     ph  

      isabel urine. Patient tolerated well.                                                        

12:07 No provider procedures requiring assistance completed. IV discontinued, intact,         ph  

      bleeding controlled, No redness/swelling at site. Pressure dressing applied.                

                                                                                                  

Administered Medications:                                                                         

10:02 Drug: NS 0.9% 1000 ml Route: IV; Rate: 1 bolus; Site: right antecubital;                ph  

11:25 Follow up: Response: No adverse reaction; IV Status: Completed infusion; IV Intake:     ph  

      1000ml                                                                                      

                                                                                                  

                                                                                                  

Medication:                                                                                       

10:22 VIS not applicable for this client.                                                     ph  

                                                                                                  

Intake:                                                                                           

11:25 IV: 1000ml; Total: 1000ml.                                                              ph  

                                                                                                  

Outcome:                                                                                          

11:57 Discharge ordered by MD.                                                                rt  

12:07 Discharged to home via wheelchair, with significant other.                              ph  

12:07 Condition: good                                                                             

12:07 Discharge instructions given to significant other, Instructed on discharge                  

      instructions, follow up and referral plans. Demonstrated understanding of instructions,     

      follow-up care.                                                                             

12:08 Patient left the ED.                                                                    ph  

                                                                                                  

Signatures:                                                                                       

Mike Smith                               em1                                                  

Lissa Shahid RN                      RN   ph                                                   

Dayday Calle MD MD   rt                                                   

                                                                                                  

**************************************************************************************************

## 2022-12-21 NOTE — EDPHYS
Physician Documentation                                                                           

 [facility]                                                                 

Name: Francisco Reagan                                                                           

Age: 72 yrs                                                                                       

Sex: Male                                                                                         

: 1950                                                                                   

MRN: [id number]                                                                                   

Arrival Date: 2022                                                                          

Time: 09:31                                                                                       

Account#: [id number]                                                                            

Bed 20                                                                                            

Private MD:                                                                                       

ED Physician Dayday Calle                                                                     

HPI:                                                                                              

                                                                                             

10:23 This 72 yrs old Male presents to ER via EMS with complaints of Urinary Problem.         rt  

10:23 The patient presents with urinary symptoms. Onset: The symptoms/episode began/occurred  rt  

      yesterday. Modifying factors: The symptoms are alleviated by nothing, the symptoms are      

      aggravated by nothing. Associated signs and symptoms: Pertinent positives: confusion.       

      Severity of symptoms: At their worst the symptoms were moderate. Unable to obtain HPI       

      due to baseline dementia. History limited due to baseline dementia, history obtained        

      per patient's wife. The wife states that the patient has been "lethargic", with             

      occasional slumping over and sitting in the chair for many hours per day which is not       

      typical for him. He states that he has had foul-smelling urine. Denies other acute          

      complaints at this time, symptoms are moderate severity, no other aggravating or            

      alleviating factors..                                                                       

                                                                                                  

Historical:                                                                                       

- Allergies:                                                                                      

09:34 No Known Allergies;                                                                     ph  

- PMHx:                                                                                           

09:34 Dementia;                                                                               ph  

                                                                                                  

- Immunization history:: Adult Immunizations unknown.                                             

- Social history:: Smoking status: unknown.                                                       

- Family history:: not pertinent.                                                                 

- Unable to obtain history due to: baseline dementia.                                             

                                                                                                  

                                                                                                  

ROS:                                                                                              

10:23 Unable to obtain ROS due to baseline dementia.                                          rt  

                                                                                                  

Exam:                                                                                             

10:23 Constitutional:  This is a well developed, well nourished patient who is awake, alert,  rt  

      and in no acute distress. Head/Face:  Normocephalic, atraumatic. Eyes:  Pupils equal        

      round and reactive to light, extra-ocular motions intact.  Lids and lashes normal.          

      Conjunctiva and sclera are non-icteric and not injected.  Cornea within normal limits.      

      Periorbital areas with no swelling, redness, or edema. ENT:  Nares patent. No nasal         

      discharge, no septal abnormalities noted.  Tympanic membranes are normal and external       

      auditory canals are clear.  Oropharynx with no redness, swelling, or masses, exudates,      

      or evidence of obstruction, uvula midline.  Mucous membranes moist. Neck:  Trachea          

      midline, no thyromegaly or masses palpated, and no cervical lymphadenopathy.  Supple,       

      full range of motion without nuchal rigidity, or vertebral point tenderness.  No            

      Meningismus. Chest/axilla:  Normal chest wall appearance and motion.  Nontender with no     

      deformity.  No lesions are appreciated. Cardiovascular:  Regular rate and rhythm with a     

      normal S1 and S2.  No gallops, murmurs, or rubs.  Normal PMI, no JVD.  No pulse             

      deficits. Respiratory:  Lungs have equal breath sounds bilaterally, clear to                

      auscultation and percussion.  No rales, rhonchi or wheezes noted.  No increased work of     

      breathing, no retractions or nasal flaring. Abdomen/GI:  Soft, non-tender, with normal      

      bowel sounds.  No distension or tympany.  No guarding or rebound.  No evidence of           

      tenderness throughout. Skin:  Warm, dry with normal turgor.  Normal color with no           

      rashes, no lesions, and no evidence of cellulitis. MS/ Extremity:  Pulses equal, no         

      cyanosis.  Neurovascular intact.  Full, normal range of motion. Neuro:  Awake and           

      alert, GCS 15, oriented to person, place, time, and situation.  Cranial nerves II-XII       

      grossly intact.  Motor strength 5/5 in all extremities.  Sensory grossly intact.            

      Cerebellar exam normal.  Normal gait. Psych:  Awake, alert, with orientation to person,     

      place and time.  Behavior, mood, and affect are within normal limits.                       

10:33 ECG was reviewed by the Attending Physician.                                            rt  

                                                                                                  

Vital Signs:                                                                                      

09:32  / 92; Pulse 70; Resp 18; Pulse Ox 100% on R/A; Weight 81.65 kg; Height 5 ft. 10  ph  

      in. (177.80 cm);                                                                            

10:19 Temp 97.8(TE);                                                                          ph  

11:06  / 91; Pulse 69; Resp 18; Pulse Ox 98% on R/A;                                    ph  

09:32 Body Mass Index 25.83 (81.65 kg, 177.80 cm)                                             ph  

                                                                                                  

MDM:                                                                                              

09:32 Patient medically screened.                                                             rt  

12:01 Differential diagnosis: UTI, dehydration, sepsis, electrolyte abnormalities, anemia.    rt  

      Data reviewed: vital signs, nurses notes, lab test result(s), EKG. ED course: Patient       

      presents to the ED with generalized weakness. The symptoms have improved with IV            

      fluids, he is ambulatory with a shuffling gait. The patient has benign labs, no             

      evidence of UTI, does have ketones in his urine, likely attributing for the smell.          

      Believe this is a continued progression of his chronic illness. At this time, there are     

      no indications for admission to the hospital. Patient to follow-up with primary care        

      and neurology as an outpatient, return precautions were discussed with the patient's        

      wife was comfortable discharge.                                                             

                                                                                                  

                                                                                             

09:33 Order name: CBC with Diff; Complete Time: 11:42                                         rt  

                                                                                             

09:33 Order name: CMP; Complete Time: 11:42                                                   rt  

                                                                                             

09:33 Order name: Magnesium; Complete Time: 11:42                                             rt  

                                                                                             

09:33 Order name: CPK; Complete Time: 11:42                                                   rt  

                                                                                             

09:33 Order name: Troponin High Sensitivity; Complete Time: 11:42                             rt  

                                                                                             

09:33 Order name: UA MICROSCOPIC; Complete Time: 11:42                                        rt  

                                                                                             

09:33 Order name: EKG; Complete Time: 09:33                                                   rt  

                                                                                             

09:33 Order name: EKG - Nurse/Tech; Complete Time: 10:19                                      rt  

                                                                                             

09:33 Order name: Urine Dipstick-Ancillary (obtain specimen); Complete Time: 11:26            rt  

                                                                                             

11:29 Order name: Urine Dipstick-Ancillary; Complete Time: 11:42                              EDMS

                                                                                                  

ECG:                                                                                              

10:33 Rate is 66 beats/min. Rhythm is regular, Normal Sinus Rhythm with No ectopy. QRS Axis   rt  

      is Normal. IL interval is normal. QRS interval is normal. QT interval is normal. No Q       

      waves. T waves are Normal. No ST changes noted.                                             

                                                                                                  

Administered Medications:                                                                         

10:02 Drug: NS 0.9% 1000 ml Route: IV; Rate: 1 bolus; Site: right antecubital;                ph  

11:25 Follow up: Response: No adverse reaction; IV Status: Completed infusion; IV Intake:     ph  

      1000ml                                                                                      

                                                                                                  

                                                                                                  

Disposition Summary:                                                                              

22 11:57                                                                                    

Discharge Ordered                                                                                 

      Location: Home                                                                          rt  

      Problem: an ongoing problem                                                             rt  

      Symptoms: have improved                                                                 rt  

      Condition: Stable                                                                       rt  

      Diagnosis                                                                                   

        - Dehydration                                                                         rt  

      Followup:                                                                               rt  

        - With: Private Physician                                                                  

        - When: 1 week                                                                             

        - Reason:                                                                                  

      Discharge Instructions:                                                                     

        - Discharge Summary Sheet                                                             rt  

        - Dehydration, Elderly                                                                rt  

      Forms:                                                                                      

        - Medication Reconciliation Form                                                      rt  

        - Thank You Letter                                                                    rt  

        - Antibiotic Education                                                                rt  

        - Prescription Opioid Use                                                             rt  

Signatures:                                                                                       

Dispatcher [facility]                                                 

Shahid, Lissa, RN                      RN   ph                                                   

Dayday Calle MD                    MD   rt                                                   

                                                                                                  

**************************************************************************************************

## 2023-01-14 ENCOUNTER — HOSPITAL ENCOUNTER (INPATIENT)
Dept: HOSPITAL 97 - ER | Age: 73
LOS: 2 days | Discharge: HOME HEALTH | DRG: 101 | End: 2023-01-16
Attending: HOSPITALIST | Admitting: HOSPITALIST
Payer: COMMERCIAL

## 2023-01-14 VITALS — BODY MASS INDEX: 22.8 KG/M2

## 2023-01-14 VITALS — OXYGEN SATURATION: 98 %

## 2023-01-14 DIAGNOSIS — G40.509: Primary | ICD-10-CM

## 2023-01-14 DIAGNOSIS — Z79.899: ICD-10-CM

## 2023-01-14 DIAGNOSIS — Z20.822: ICD-10-CM

## 2023-01-14 DIAGNOSIS — F02.80: ICD-10-CM

## 2023-01-14 DIAGNOSIS — N30.00: ICD-10-CM

## 2023-01-14 DIAGNOSIS — G30.9: ICD-10-CM

## 2023-01-14 LAB
ALBUMIN SERPL BCP-MCNC: 3.1 G/DL (ref 3.4–5)
ALP SERPL-CCNC: 67 U/L (ref 45–117)
ALT SERPL W P-5'-P-CCNC: 18 U/L (ref 16–61)
AST SERPL W P-5'-P-CCNC: 17 U/L (ref 15–37)
BUN BLD-MCNC: 14 MG/DL (ref 7–18)
GLUCOSE SERPLBLD-MCNC: 131 MG/DL (ref 74–106)
HCT VFR BLD CALC: 38.4 % (ref 39.6–49)
INR BLD: 1.15
LYMPHOCYTES # SPEC AUTO: 1 K/UL (ref 0.7–4.9)
MAGNESIUM SERPL-MCNC: 2.4 MG/DL (ref 1.6–2.4)
MCV RBC: 91.3 FL (ref 80–100)
NT-PROBNP SERPL-MCNC: 539 PG/ML (ref ?–125)
PMV BLD: 8 FL (ref 7.6–11.3)
POTASSIUM SERPL-SCNC: 4.1 MMOL/L (ref 3.5–5.1)
RBC # BLD: 4.21 M/UL (ref 4.33–5.43)
SARS-COV-2 RNA RESP QL NAA+PROBE: NEGATIVE
TROPONIN I SERPL HS-MCNC: 6.3 PG/ML (ref ?–58.9)

## 2023-01-14 PROCEDURE — 96365 THER/PROPH/DIAG IV INF INIT: CPT

## 2023-01-14 PROCEDURE — 83735 ASSAY OF MAGNESIUM: CPT

## 2023-01-14 PROCEDURE — 99285 EMERGENCY DEPT VISIT HI MDM: CPT

## 2023-01-14 PROCEDURE — 80048 BASIC METABOLIC PNL TOTAL CA: CPT

## 2023-01-14 PROCEDURE — 84100 ASSAY OF PHOSPHORUS: CPT

## 2023-01-14 PROCEDURE — 36415 COLL VENOUS BLD VENIPUNCTURE: CPT

## 2023-01-14 PROCEDURE — 84484 ASSAY OF TROPONIN QUANT: CPT

## 2023-01-14 PROCEDURE — 87040 BLOOD CULTURE FOR BACTERIA: CPT

## 2023-01-14 PROCEDURE — 85025 COMPLETE CBC W/AUTO DIFF WBC: CPT

## 2023-01-14 PROCEDURE — 81015 MICROSCOPIC EXAM OF URINE: CPT

## 2023-01-14 PROCEDURE — 96366 THER/PROPH/DIAG IV INF ADDON: CPT

## 2023-01-14 PROCEDURE — 81003 URINALYSIS AUTO W/O SCOPE: CPT

## 2023-01-14 PROCEDURE — 97116 GAIT TRAINING THERAPY: CPT

## 2023-01-14 PROCEDURE — 71045 X-RAY EXAM CHEST 1 VIEW: CPT

## 2023-01-14 PROCEDURE — 83605 ASSAY OF LACTIC ACID: CPT

## 2023-01-14 PROCEDURE — 96367 TX/PROPH/DG ADDL SEQ IV INF: CPT

## 2023-01-14 PROCEDURE — 83880 ASSAY OF NATRIURETIC PEPTIDE: CPT

## 2023-01-14 PROCEDURE — 87088 URINE BACTERIA CULTURE: CPT

## 2023-01-14 PROCEDURE — 85610 PROTHROMBIN TIME: CPT

## 2023-01-14 PROCEDURE — 93005 ELECTROCARDIOGRAM TRACING: CPT

## 2023-01-14 PROCEDURE — 80076 HEPATIC FUNCTION PANEL: CPT

## 2023-01-14 PROCEDURE — 70450 CT HEAD/BRAIN W/O DYE: CPT

## 2023-01-14 PROCEDURE — 82550 ASSAY OF CK (CPK): CPT

## 2023-01-14 PROCEDURE — 0240U: CPT

## 2023-01-14 PROCEDURE — 96368 THER/DIAG CONCURRENT INF: CPT

## 2023-01-14 PROCEDURE — 84145 PROCALCITONIN (PCT): CPT

## 2023-01-14 PROCEDURE — 82140 ASSAY OF AMMONIA: CPT

## 2023-01-14 PROCEDURE — 95816 EEG AWAKE AND DROWSY: CPT

## 2023-01-14 PROCEDURE — 97161 PT EVAL LOW COMPLEX 20 MIN: CPT

## 2023-01-14 PROCEDURE — 87086 URINE CULTURE/COLONY COUNT: CPT

## 2023-01-14 PROCEDURE — 82947 ASSAY GLUCOSE BLOOD QUANT: CPT

## 2023-01-14 RX ADMIN — Medication PRN MG: at 20:06

## 2023-01-14 RX ADMIN — Medication SCH ML: at 20:06

## 2023-01-14 RX ADMIN — SODIUM CHLORIDE SCH MLS: 0.9 INJECTION, SOLUTION INTRAVENOUS at 17:34

## 2023-01-14 RX ADMIN — SODIUM CHLORIDE SCH: 0.9 INJECTION, SOLUTION INTRAVENOUS at 11:00

## 2023-01-14 RX ADMIN — LEVETIRACETAM SCH MLS: 100 INJECTION, SOLUTION, CONCENTRATE INTRAVENOUS at 18:27

## 2023-01-14 NOTE — P.HP
Certification for Inpatient


Patient admitted to: Observation


With expected LOS: <2 Midnights


Patient will require the following post-hospital care: None


Practitioner: I am a practitioner with admitting privileges, knowledge of 

patient current condition, hospital course, and medical plan of care.


Services: Services provided to patient in accordance with Admission requirements

found in Title 42 Section 412.3 of the Code of Federal Regulations





Patient History


Date of Service: 01/14/23


Reason for admission: New seizure, AMS


History of Present Illness: 





72-year-old  male with past medical history of dementia.  Patient 

presents to the emergency room via EMS with complaints of altered mental status 

and seizure-like activities.  Patient was evaluated in the ER, with wife at the 

bedside.  Patient is cognitively impaired due to dementia.  Review of system and

history obtained from wife.  Per wife, around 7 AM this morning, she turned over

to look at her , she noticed that his arms were twitching and he was 

shaking and was making "weird sounds".  She states it looks like he was having a

seizure, which lasted for 20 seconds.  She reports that her  was not 

responsive and he went back to sleep.  She denies any foaming at the mouth.  She

denies any other contributing symptoms.  EMS was called to the house and patient

was taken to the emergency room.  Patient will be admitted under the care of Dr. Guerra.  Neurology will be consulted for further recommendations.  Seizure 

precautions in place.


Allergies





No Known Allergies Allergy (Unverified 08/17/18 07:56)


   





Home medications list reviewed: Yes


Home Medications: 








Fiber [Fiber Diet] 1 each PO DAILY 08/17/18 


Krill/Om-3/Dha/Epa/Phospho/Ast [Megared Omega-3 Krill Oil Sfgl] 1 each PO DAILY 

08/17/18 


Multivit-Min/FA/Lycopen/Lutein [Men 50 Plus Multivitamin Tab] 1 tab PO DAILY 

08/17/18 


Plant Stanol Rashmi [Cholest Off] 450 mg PO DAILY 08/17/18 


Ubidecarenone [Co Q-10] 100 mg PO DAILY 08/17/18 








- Past Medical/Surgical History


-: Dementia


Past Surgical History: Reviewed- Non-Contributory





- Family History


Family History: Reviewed- Non-Contributory





- Social History


Smoking Status: Never smoker


Alcohol use: No


CD- Drugs: No


Caffeine use: No


Place of Residence: Home





Review of Systems


General: Weakness


Genitourinary: Incontinence


Neurological: Weakness, Seizures





Physical Examination





- Vital Signs


Temperature: 98.5 F


Blood Pressure: 117/77


Pulse: 76


Respirations: 15


Pulse Ox (%): 98





- Physical Exam


General: Alert, Demented, Confused


HEENT: Atraumatic, Normocephalic, PERRLA


Neck: Supple, 2+ carotid pulse no bruit


Respiratory: Diminished


Cardiovascular: No edema, Normal pulses, Regular rate/rhythm


Capillary refill: <2 Seconds


Gastrointestinal: Normal bowel sounds


Musculoskeletal: No clubbing, No swelling


Integumentary: Skin lesion


Neurological: Dementia


Lymphatics: No axilla or inguinal lymphadenopathy





- Studies


Laboratory Data (last 24 hrs)





01/14/23 09:40: PT 12.7 H, INR 1.15


01/14/23 09:40: WBC 9.90, Hgb 12.7 L, Hct 38.4 L, Plt Count 266


01/14/23 09:40: Sodium 144, Potassium 4.1, BUN 14, Creatinine 0.99, Glucose 131 

H, Magnesium 2.4, Total Bilirubin 0.7, AST 17, ALT 18, Alkaline Phosphatase 67








Assessment and Plan





- Plan


Assessment


New seizures rule out CVA TIA


AMS


Dementia





Plan


New seizures rule out CVA/TIA


   No further seizure-like activities


   Continue seizure precaution


   Keep patient n.p.o.


   Swallow evaluation, advance diet as tolerated


   Head CT negative for any acute findings


   Neurology consulted, for further recommendations


AMS


   History of dementia


   Head CT negative for any acute findings


   UA ordered


Dementia


   Continue to monitor


PPX- Lovenox


Code Status- Full code


Discharge Plan: Home


Plan to discharge in: 48 Hours





- Advance Directives


Does patient have a Living Will: Yes


Does patient have a Durable POA for Healthcare: Yes





- Code Status/Comfort Care


Code Status Assessed: Yes (Full code)


Critical Care: No


Time Spent Managing Pts Care (In Minutes): 50

## 2023-01-14 NOTE — XMS REPORT
Continuity of Care Document

                           Created on:2023



Patient:FRANCISCO QIU

Sex:Male

:1950

External Reference #:[id number]





Demographics







                          Address                   [address] [place] [address]

 

                          Home Phone                [phone]

 

                          Mobile Phone              [phone]

 

                          Email Address             [email or web address]

 

                          Preferred Language        en

 

                          Marital Status            Unknown

 

                          Shinto Affiliation     Unknown

 

                          Race                      Unknown

 

                          Additional Race(s)        White

 

                          Ethnic Group              Not  or 









Author







                          Organization              [facility]

t

 

                          Address                   [address]



                                                    [place] [address]

 

                          Phone                     [phone]









Support







                Name            Relationship    Address         Phone

 

                Francisco Qiu     [address] [facility] [phone]



                                                [place] [address] 

 

                Franchesca Qiu Spouse          [address] [facility] [phone]



                                                [place] [address] 









Care Team Providers







                    Name                Role                Phone

 

                    ErvinKiah ACOSTA  Primary Care Physician [phone]

 

                    Sheri Medrano        Attending Clinician Unavailable

 

                    JIM SHEFFIELD      Attending Clinician Unavailable

 

                    JIM SHEFFIELD      Attending Clinician Unavailable

 

                    Zion Mahan MD Attending Clinician [phone]

 

                    Jim Sheffield MD   Attending Clinician [phone]

 

                    Doctor Unassigned, No Name Attending Clinician Unavailable

 

                    Angelita Michelle LMSW Attending Clinician [phone]

 

                    ZION MAHAN Attending Clinician Unavailable

 

                    ZION MAHAN Attending Clinician Unavailable

 

                    Nettie Garcia PA-C   Attending Clinician [phone]

 

                    NETTIE GARCIA        Attending Clinician Unavailable

 

                    ELLY TYSON      Attending Clinician Unavailable

 

                    Elly Hinton  Attending Clinician [phone]

 

                    Eduardo Gamez MD Attending Clinician [phone]









Payers







           Payer Name Policy Type Policy Number Effective Date Expiration Date S

ource MEDICARE MB         [id number]                       [facility]



           NOVITAS                                                - [facility]

 

           [employer] LIFE C1         [id number]                       [facility]

t



           INS [facility]                                                 - [facility]







Problems







       Condition Condition Condition Status Onset  Resolution Last   Treating Co

mments 

Source



       Name   Details Category        Date   Date   Treatment Clinician        



                                                 Date                 

 

       S/P total S/P total Disease Active                              Uni

vers



       hip    hip                  2-20                               [facility] of



       arthroplas arthroplas               00:00:                             [facility]

[place]



       ty     ty                   00                                 Medical



                                                                      Branch

 

       [id number] Hyperlipid Problem                                           Com

mon



              emia,                                                   [facility]



              unspecifie                                                  [facility]



              d                                                       



              hyperlipid                                                  [facility]



              emia type                                                  [facility]

 

       [id number] Screening Problem                                           Com

mon



              for                                                     [facility]



              prostate                                                  - [facility]



              cancer                                                  [facility]

 

       [id number] Memory Problem                                           Common



              loss                                                    [facility]



                                                                      - [facility]

 

       [id number] Cognitive Problem                                           Com

mon



              dysfunctio                                                  [facility]



              n                                                       - [facility]



                                                                      [facility]

 

       [id number] Alzheimer' Problem                                           C

ommon



       [id number] s disease                                                  [facility]



              with late                                                  - [facility]



              onset                                                   [facility]

 

       [id number] +6th digit Problem                                           C

ommon



       103    eff                                                     [facility]



              10/1/22*De                                                  - [facility]



              mentia in                                                  [facility]



              elsewhere                                                  



              with                                                    



              behavioral                                                  



              disturbanc                                                  



              e                                                       

 

       Dementia Dementia Problem                                           Commo

n



                                                                      [facility]







Allergies, Adverse Reactions, Alerts







       Allergy Allergy Status Severity Reaction(s) Onset  Inactive Treating Comm

ents 

Source



       Name   Type                        Date   Date   Clinician        

 

       NO KNOWN Drug   Active                                           [facility]



       ALLERGIE Class                                                   [facility] of



       S                                                              [facility]







Social History







           Social Habit Start Date Stop Date  Quantity   Comments   Source

 

           History of                                             [facility] 

-



           Tobacco Use                                             [facility]

 

           Sex Assigned At                                             [facility]

paul -



           Birth                                                  [facility]

 

           Exposure to 2022 Not sure              [facility]



           SARS-CoV-2 00:00:00   09:01:00                         [facility]



           (event)                                                [place]

 

           Alcohol intake 2022 0 /d                  [facility]

 



                      00:00:00   00:00:00                         [facility]

 

           Tobacco use and 2016 Smokeless tobacco            Un

iversity of



           exposure   00:00:00   00:00:00   non-user              [facility]









                Smoking Status  Start Date      Stop Date       Source

 

                Never smoked tobacco                                 [facility]







Medications







       Ordered Filled Start  Stop   Current Ordering Indication Dosage Frequency

 Signature

                    Comments            Components          Source



     Medication Medication Date Date Medication? Clinician                (SIG) 

          



     Name Name                                                   

 

     rivastigmin      2022      Yes       [id number] 1{patch      Apply 1       

    [facility]



     e 9.5 mg/24      1-16                     }         Patch to           ity 

of



     hour patch      00:00:                               skin in           [place]

s



               00                                 the            Medical



                                                  morning.           Branch

 

     rivastigmin      2022      Yes       [id number] 1{patch      Apply 1       

    [facility]



     e 9.5 mg/24      1-16                     }         Patch to           ity 

of



     hour patch      00:00:                               skin in           [place]

                                 the            Medical



                                                  morning.           Branch

 

     rivastigmin      2022      Yes       [id number] 1{patch      Apply 1       

    [facility]



     e 9.5 mg/24      1-16                     }         Patch to           ity 

of



     hour patch      00:00:                               skin in           [facility]

s



                                                the            Medical



                                                  morning.           Branch

 

     rivastigmin      2022      Yes       [id number] 1{patch      Apply 1       

    [facility]



     e 9.5 mg/24      1-16                     }         Patch to           ity 

of



     hour patch      00:00:                               skin in           [place]

s



                                                the            Medical



                                                  morning.           Branch

 

     rivastigmin      2022      Yes       [id number] 1{patch      Apply 1       

    [facility]



     e 9.5 mg/24      1-16                     }         Patch to           ity 

of



     hour patch      00:00:                               skin in           [facility]

                                 the            Medical



                                                  morning.           Branch

 

     rivastigmin      2022      Yes       [id number] 1{patch      Apply 1       

    [facility]



     e 9.5 mg/24      1-16                     }         Patch to           ity 

of



     hour patch      00:00:                               skin in           [place]

s



                                                the            Medical



                                                  morning.           Branch

 

     rivastigmin      2022      Yes       [id number] 1{patch      Apply 1       

    [facility]



     e 9.5 mg/24      1-16                     }         Patch to           ity 

of



     hour patch      00:00:                               skin in           a

s



                                                the            Medical



                                                  morning.           Branch

 

     rivastigmin            Yes            13.3mg      Apply 13.3         

  [facility]



     e (EXELON      9-14                               mg to skin           ity 

of



     PATCH) 13.3      00:00:                               daily.           [place]

s



     mg/24 hour      00                                                Medical



     PT24                                                        Branch

 

     rivastigmin      -0      Yes            13.3mg      Apply 13.3         

  [facility]



     e (EXELON      9-14                               mg to skin           ity 

of



     PATCH) 13.3      00:00:                               daily.           [place]

s



     mg/24 hour      00                                                Medical



     PT24                                                        Branch

 

     rivastigmin      -0      Yes            13.3mg      Apply 13.3         

  [facility]



     e (EXELON      9-14                               mg to skin           ity 

of



     PATCH) 13.3      00:00:                               daily.           [place]

s



     mg/24 hour      00                                                Medical



     PT24                                                        Branch

 

     rivastigmin      -0      Yes            13.3mg      Apply 13.3         

  Univers



     e (EXELON      9-14                               mg to skin           ity 

of



     PATCH) 13.3      00:00:                               daily.           [place]

s



     mg/24 hour      00                                                Medical



     PT24                                                        Branch

 

     rivastigmin      -0      Yes            13.3mg      Apply 13.3         

  Univers



     e (EXELON      9-14                               mg to skin           ity 

of



     PATCH) 13.3      00:00:                               daily.           [place]

s



     mg/24 hour      00                                                Medical



     PT24                                                        Branch

 

     rivastigmin      -0      Yes            13.3mg      Apply 13.3         

  Univers



     e (EXELON      9-14                               mg to skin           ity 

of



     PATCH) 13.3      00:00:                               daily.           [place]

s



     mg/24 hour      00                                                Medical



     PT24                                                        Branch

 

     rivastigmin      -0      Yes            13.3mg      Apply 13.3         

  Univers



     e (EXELON      9-14                               mg to skin           ity 

of



     PATCH) 13.3      00:00:                               daily.           [place]

s



     mg/24 hour      00                                                Medical



     PT24                                                        Branch

 

     rivastigmin      -0      Yes            13.3mg      Apply 13.3         

  Univers



     e (EXELON      9-14                               mg to skin           ity 

of



     PATCH) 13.3      00:00:                               daily.           [place]

s



     mg/24 hour      00                                                Medical



     PT24                                                        Branch

 

     rivastigmin      -0      Yes            13.3mg      Apply 13.3         

  Univers



     e (EXELON      9-14                               mg to skin           ity 

of



     PATCH) 13.3      00:00:                               daily.           [place]

s



     mg/24 hour      00                                                Medical



     PT24                                                        Branch

 

     rivastigmin      -0      Yes            13.3mg      Apply 13.3         

  Univers



     e (EXELON      9-14                               mg to skin           ity 

of



     PATCH) 13.3      00:00:                               daily.           [place]

s



     mg/24 hour      00                                                Medical



     PT24                                                        Branch

 

     rivastigmin      -0      Yes            13.3mg      Apply 13.3         

  Univers



     e (EXELON      9-14                               mg to skin           ity 

of



     PATCH) 13.3      00:00:                               daily.           [place]

s



     mg/24 hour      00                                                Medical



     PT24                                                        Branch

 

     rivastigmin      -0      Yes            13.3mg      Apply 13.3         

  Univers



     e (EXELON      9-14                               mg to skin           ity 

of



     PATCH) 13.3      00:00:                               daily.           [place]

s



     mg/24 hour      00                                                Medical



     PT24                                                        Branch

 

     rivastigmin      -0      Yes            13.3mg      Apply 13.3         

  Univers



     e (EXELON      9-14                               mg to skin           ity 

of



     PATCH) 13.3      00:00:                               daily.           [place]

s



     mg/24 hour      00                                                Medical



     PT24                                                        Branch

 

     rivastigmin            Yes            13.3mg      Apply 13.3         

  Univers



     e (EXELON      9-14                               mg to skin           ity 

of



     PATCH) 13.3      00:00:                               daily.           [place]

s



     mg/24 hour      00                                                Medical



     PT24                                                        Branch

 

     rivastigmin      0      Yes            13.3mg      Apply 13.3         

  Univers



     e (EXELON      9-14                               mg to skin           ity 

of



     PATCH) 13.3      00:00:                               daily.           [place]

s



     mg/24 hour      00                                                Medical



     PT24                                                        Branch

 

     rivastigmin      2022- No             13.3mg      Apply 13.3        

   Univers



     e (EXELON      9-14 11-16                          mg to skin           ity

 of



     PATCH) 13.3      00:00: 00:00                          daily.           [place]

as



     mg/24 hour      00   :00                                          Medical



     PT24                                                        Branch

 

     EXELON      2022- No             13.3mg      Apply 13.3           Un

nelly



     PATCH 13.3      9-08 09-14                          mg to skin           it

y of



     mg/24 hour      00:00: 00:00                          daily.           [place]

s



     PT24      00   :00                                          Medical



                                                                 Branch

 

     divalproex            Yes       [id number] 125mg      Take 1        

   Univers



     Sprinkles      9-06                [id number]           capsule by           it

y of



     (DEPAKOTE      00:00:                               mouth           [place]



     SPRINKLES)      00                                 every 12           Medic

al



     125 mg                                         (twelve)           Branch



     SPRINKLE                                         hours.           



     capsule                                                        

 

     divalproex            Yes       [id number] 125mg      Take 1        

   Univers



     Sprinkles      9-06                [id number]           capsule by           it

y of



     (DEPAKOTE      00:00:                               mouth           [place]



     SPRINKLES)      00                                 every 12           Medic

al



     125 mg                                         (twelve)           Branch



     SPRINKLE                                         hours.           



     capsule                                                        

 

     divalproex            Yes       [id number] 125mg      Take 1        

   Univers



     Sprinkles      9-06                [id number]           capsule by           it

y of



     (DEPAKOTE      00:00:                               mouth           [place]



     SPRINKLES)      00                                 every 12           Medic

al



     125 mg                                         (twelve)           Branch



     SPRINKLE                                         hours.           



     capsule                                                        

 

     divalproex            Yes       [id number] 125mg      Take 1           

Univers



     Sprinkles      9-06                               capsule by           ity 

of



     (DEPAKOTE      00:00:                               mouth           [place]



     SPRINKLES)      00                                 every 12           Medic

al



     125 mg                                         (twelve)           Branch



     SPRINKLE                                         hours.           



     capsule                                                        

 

     divalproex      0      Yes       [id number] 125mg      Take 1           

Univers



     Sprinkles      9-06                               capsule by           ity 

of



     (DEPAKOTE      00:00:                               mouth           [place]



     SPRINKLES)      00                                 every 12           Medic

al



     125 mg                                         (twelve)           Branch



     SPRINKLE                                         hours.           



     capsule                                                        

 

     divalproex      -0      Yes       [id number] 125mg      Take 1           

Univers



     Sprinkles      9-06                               capsule by           ity 

of



     (DEPAKOTE      00:00:                               mouth           [place]



     SPRINKLES)      00                                 every 12           Medic

al



     125 mg                                         (twelve)           Branch



     SPRINKLE                                         hours.           



     capsule                                                        

 

     divalproex      -0      Yes       [id number] 125mg      Take 1           

Univers



     Sprinkles      9-06                               capsule by           ity 

of



     (DEPAKOTE      00:00:                               mouth           [place]



     SPRINKLES)      00                                 every 12           Medic

al



     125 mg                                         (twelve)           Branch



     SPRINKLE                                         hours.           



     capsule                                                        

 

     divalproex      -0      Yes       [id number] 125mg      Take 1           

Univers



     Sprinkles      9-06                               capsule by           ity 

of



     (DEPAKOTE      00:00:                               mouth           [place]



     SPRINKLES)      00                                 every 12           Medic

al



     125 mg                                         (twelve)           Branch



     SPRINKLE                                         hours.           



     capsule                                                        

 

     divalproex      -0      Yes       [id number] 125mg      Take 1           

Univers



     Sprinkles      9-06                               capsule by           ity 

of



     (DEPAKOTE      00:00:                               mouth           [place]



     SPRINKLES)      00                                 every 12           Medic

al



     125 mg                                         (twelve)           Branch



     SPRINKLE                                         hours.           



     capsule                                                        

 

     divalproex      -0      Yes       [id number] 125mg      Take 1           

Univers



     Sprinkles      9-06                               capsule by           ity 

of



     (DEPAKOTE      00:00:                               mouth           [place]



     SPRINKLES)      00                                 every 12           Medic

al



     125 mg                                         (twelve)           [facility]



     SPRINKLE                                         hours.           



     capsule                                                        

 

     divalproex      -0      Yes       [id number] 125mg      Take 1           

[facility]



     Sprinkles      9-06                               capsule by           [facility] 

of



     (DEPAKOTE      00:00:                               mouth           [place]



     SPRINKLES)      00                                 every 12           Medic

al



     125 mg                                         (twelve)           [facility]



     SPRINKLE                                         hours.           



     capsule                                                        

 

     divalproex      -0      Yes       [id number] 125mg      Take 1           

[facility]



     Sprinkles      9-06                               capsule by           [facility] 

of



     (DEPAKOTE      00:00:                               mouth           [place]



     SPRINKLES)      00                                 every 12           Medic

al



     125 mg                                         (twelve)           [facility]



     SPRINKLE                                         hours.           



     capsule                                                        

 

     divalproex      -0      Yes       [id number] 125mg      Take 1           

[facility]



     Sprinkles      9-06                               capsule by           [facility] 

of



     (DEPAKOTE      00:00:                               mouth           [place]



     SPRINKLES)      00                                 every 12           Medic

al



     125 mg                                         (twelve)           [facility]



     SPRINKLE                                         hours.           



     capsule                                                        

 

     divalproex      2022-0      Yes       [id number] 125mg      Take 1           

[facility]



     Sprinkles      9-06                               capsule by           [facility] 

of



     (DEPAKOTE      00:00:                               mouth           [place]



     SPRINKLES)      00                                 every 12           Med[facility]

al



     125 mg                                         (twelve)           [facility]



     SPRINKLE                                         hours.           



     capsule                                                        

 

     divalproex      2022-0      Yes       [id number] 125mg      Take 1           

[facility]



     Sprinkles      9-06                               capsule by           [facility] 

of



     (DEPAKOTE      00:00:                               mouth           [place]



     SPRINKLES)      00                                 every 12           Medic

al



     125 mg                                         (twelve)           [facility]



     SPRINKLE                                         hours.           



     capsule                                                        

 

     divalproex      2022-0      Yes       [id number] 125mg      Take 1           

[facility]



     Sprinkles      9-06                               capsule by           [facility] 

of



     (DEPAKOTE      00:00:                               mouth           [place]



     SPRINKLES)      00                                 every 12           Med[facility]

al



     125 mg                                         (twelve)           [facility]



     SPRINKLE                                         hours.           



     capsule                                                        

 

     divalproex      2022-0      Yes       [id number] 125mg      Take 1           

[facility]



     Sprinkles      9-06                               capsule by           [facility] 

of



     (DEPAKOTE      00:00:                               mouth           [place]



     SPRINKLES)      00                                 every 12           Med[facility]

al



     125 mg                                         (twelve)           [facility]



     SPRINKLE                                         hours.           



     capsule                                                        

 

     divalproex      2022-0      Yes       [id number] 125mg      Take 1           

[facility]



     Sprinkles      9-06                               capsule by           [facility] 

of



     (DEPAKOTE      00:00:                               mouth           [place]



     SPRINKLES)      00                                 every 12           Medic

al



     125 mg                                         (twelve)           [facility]



     SPRINKLE                                         hours.           



     capsule                                                        

 

     divalproex      2-0      Yes       [id number] 125mg      Take 1           

[facility]



     Sprinkles      9-06                               capsule by           [facility] 

of



     (DEPAKOTE      00:00:                               mouth           [place]



     SPRINKLES)      00                                 every 12           Med[facility]

al



     125 mg                                         (twelve)           [facility]



     SPRINKLE                                         hours.           



     capsule                                                        

 

     divalproex      2022-0      Yes       [id number] 125mg      Take 1           

[facility]



     Sprinkles      9-06                               capsule by           [facility] 

of



     (DEPAKOTE      00:00:                               mouth           [place]



     SPRINKLES)      00                                 every 12           Medic

al



     125 mg                                         (twelve)           [facility]



     SPRINKLE                                         hours.           



     capsule                                                        

 

     divalproex      2022-0      Yes       [id number] 125mg      Take 1           

[facility]



     Sprinkles      9-06                               capsule by           [facility] 

of



     (DEPAKOTE      00:00:                               mouth           [place]



     SPRINKLES)      00                                 every 12           Medic

al



     125 mg                                         (twelve)           [facility]



     SPRINKLE                                         hours.           



     capsule                                                        

 

     divalproex      2022-0      Yes       [id number] 125mg      Take 1           

[facility]



     Sprinkles      9-06                               capsule by           [facility] 

of



     (DEPAKOTE      00:00:                               mouth           [place]



     SPRINKLES)      00                                 every 12           Medic

al



     125 mg                                         (twelve)           [facility]



     SPRINKLE                                         hours.           



     capsule                                                        

 

     divalproex      0      Yes       [id number] 125mg      Take 1           

[facility]



     Sprinkles      9-06                               capsule by           [facility] 

of



     (DEPAKOTE      00:00:                               mouth           [place]



     SPRINKLES)      00                                 every 12           Medic

al



     125 mg                                         (twelve)           [facility]



     SPRINKLE                                         hours.           



     capsule                                                        

 

     divalproex      -0      Yes       [id number] 125mg      Take 1           

[facility]



     Sprinkles      9-06                               capsule by           [facility] 

of



     (DEPAKOTE      00:00:                               mouth           [place]



     SPRINKLES)      00                                 every 12           Medic

al



     125 mg                                         (twelve)           [facility]



     SPRINKLE                                         hours.           



     capsule                                                        

 

     rivastigmin      2022- No        [id number] 13.3mg      Apply 13.3 

          [facility]



     e 13.3      9 09-14           [id number]           mg to skin           [facility] 

of



     mg/24 hour      00:00: 00:00                          daily.           [place]

s



     PT24      00   :                                          [facility]

 

     memantine 5      -0      Yes            5mg       Take 5 mg           U

nivers



     mg tablet      5-05                               by mouth           [facility] of



               00:00:                               daily.           [place]



               00                                                Medical



                                                                 [facility]

 

     memantine 5      -0      Yes            5mg       Take 5 mg           U

nivers



     mg tablet      5-05                               by mouth           [facility] of



               00:00:                               daily.           [place]



               00                                                Medical



                                                                 [facility]

 

     memantine 5      -0      Yes            5mg       Take 5 mg           U

nivers



     mg tablet      5-05                               by mouth           [facility] of



               00:00:                               daily.           [place]



               00                                                [facility]

 

     memantine 5      -0      Yes            5mg       Take 5 mg           U

nivers



     mg tablet      5-05                               by mouth           [facility] of



               00:00:                               daily.           [place]



               00                                                [facility]

 

     memantine 5      -0      Yes            5mg       Take 5 mg           U

nivers



     mg tablet      5-05                               by mouth           [facility] of



               00:00:                               daily.           [place]



               00                                                [facility]

 

     memantine 5      -0      Yes            5mg       Take 5 mg           U

nivers



     mg tablet      5-05                               by mouth           ity of



               00:00:                               daily.           [place]



               00                                                [facility]

 

     memantine 5      -0      Yes            5mg       Take 5 mg           U

nivers



     mg tablet      5-05                               by mouth           ity of



               00:00:                               daily.           [place]



               00                                                [facility]

 

     memantine 5      -0      Yes            5mg       Take 5 mg           U

nivers



     mg tablet      5-05                               by mouth           ity of



               00:00:                               daily.           [place]



                                                               Medical



                                                                 [facility]

 

     memantine 5      -0      Yes            5mg       Take 5 mg           U

nivers



     mg tablet      5-05                               by mouth           [facility] of



               00:00:                               daily.           [place]



                                                               Medical



                                                                 [facility]

 

     memantine 5      -0      Yes            5mg       Take 5 mg           U

nivers



     mg tablet      5-05                               by mouth           ity of



               00:00:                               daily.           [place]



                                                               [facility]

 

     memantine 5      -0      Yes            5mg       Take 5 mg           U

nivers



     mg tablet      5-05                               by mouth           ity of



               00:00:                               daily.           [place]



                                                               [facility]



                                                                 [facility]

 

     memantine 5      -0      Yes            5mg       Take 5 mg           U

nivers



     mg tablet      5-05                               by mouth           [facility] of



               00:00:                               daily.           [place]



                                                               Medical



                                                                 [facility]

 

     memantine 5      -0      Yes            5mg       Take 5 mg           U

nivers



     mg tablet      5-05                               by mouth           ity of



               00:00:                               daily.           [place]



                                                               [facility]

 

     memantine 5      -0      Yes            5mg       Take 5 mg           U

nivers



     mg tablet      5-05                               by mouth           ity of



               00:00:                               daily.           [place]



                                                               [facility]

 

     memantine 5      -0      Yes            5mg       Take 5 mg           U

nivers



     mg tablet      5-05                               by mouth           ity of



               00:00:                               daily.           [place]



                                                               [facility]

 

     memantine 5      -0      Yes            5mg       Take 5 mg           U

nivers



     mg tablet      5-05                               by mouth           ity of



               00:00:                               daily.           [place]



                                                               Medical



                                                                 [facility]

 

     memantine 5      -0      Yes            5mg       Take 5 mg           U

nivers



     mg tablet      5-05                               by mouth           ity of



               00:00:                               daily.           [place]



                                                               [facility]

 

     memantine 5      -0      Yes            5mg       Take 5 mg           U

nivers



     mg tablet      5-05                               by mouth           ity of



               00:00:                               daily.           [place]



                                                               Medical



                                                                 [facility]

 

     memantine 5      -0      Yes            5mg       Take 5 mg           U

nivers



     mg tablet      5-05                               by mouth           ity of



               00:00:                               daily.           [place]



                                                               Medical



                                                                 Branch

 

     memantine 5      -0      Yes            5mg       Take 5 mg           U

nivers



     mg tablet      5-05                               by mouth           ity of



               00:00:                               daily.           [place]



                                                               [facility]

 

     memantine 5      -0      Yes            5mg       Take 5 mg           U

nivers



     mg tablet      5-05                               by mouth           ity of



               00:00:                               daily.           [place]



                                                               [facility]

 

     memantine 5      -0      Yes            5mg       Take 5 mg           U

nivers



     mg tablet      5-05                               by mouth           ity of



               00:00:                               daily.           [place]



                                                               [facility]

 

     memantine 5      -      Yes            5mg       Take 5 mg           U

nivers



     mg tablet      5-05                               by mouth           ity of



               00:00:                               daily.           [place]



                                                               [facility]

 

     memantine 5      -0      Yes            5mg       Take 5 mg           U

nivers



     mg tablet      5-05                               by mouth           ity of



               00:00:                               daily.           [place]



                                                               [facility]

 

     memantine 5      -      Yes            5mg       Take 5 mg           U

nivers



     mg tablet      5-05                               by mouth           ity of



               00:00:                               daily.           [address]

 

     CholestOff CholestOff       Yes  Sheri                as             

Common



                          [place]                directed           [facility]



               00:00:                                              - [facility]



                                                               [facility]

 

     Fish Oil Fish Oil       Yes  Sheri                1 capsule          

 Common



                          [place]                               [facility]



               00:00:                                              - [facility]



                                                               [facility]

 

     Co Q10 Co Q10       Yes  Sheri                1 tablet           Comm

on



                          [place]                with a           [facility]



               00:00:                               meal           - [facility]



                                                               [facility]

 

     CholestOff CholestOff       No                       CholestOff      

     



     450  MG                                450 MG           



               00:00:                                              



               00                                                

 

     CholestOff CholestOff       No                       CholestOff      

     



     450  MG                                450 MG           



               00:00:                                              



               00                                                

 

     Fish Oil Fish Oil       No             1{capsu QD   Fish Oil         

  



     1000 MG 1000 MG                      le}       1000 MG           



               00:00:                                              



               00                                                

 

     Co Q10 100 Co Q10 100       No             1{table QD   Co Q10 100   

        



     MG   MG                        t_with_      MG             



               00:00:                     a_meal}                     



                                                               

 

     CholestOff CholestOff 0      No                       CholestOff      

     



     450  MG                                450 MG           



               00:00:                                              



               00                                                

 

     Co Q10 100 Co Q10 100       No             1{table QD   Co Q10 100   

        



     MG   MG                        t_with_      MG             



               00:00:                     a_meal}                     



                                                               

 

     Fish Oil Fish Oil       No             1{capsu QD   Fish Oil         

  



     1000 MG 1000 MG                      le}       1000 MG           



               00:00:                                              



               00                                                

 

     Co Q10 100 Co Q10 100       No             1{table QD   Co Q10 100   

        



     MG   MG                        t_with_      MG             



               00:00:                     a_meal}                     



                                                               

 

     Fish Oil Fish Oil       No             1{capsu QD   Fish Oil         

  



     1000 MG 1000 MG                      le}       1000 MG           



               00:00:                                              



               00                                                

 

     CholestOff CholestOff       No                       CholestOff      

     



     450  MG 7-09                               450 MG           



               00:00:                                              



               00                                                

 

     Co Q10 100 Co Q10 100       No             1{table QD   Co Q10 100   

        



     MG   MG   7-                     t_with_      MG             



               00:00:                     a_meal}                     



               00                                                

 

     Fish Oil Fish Oil       No             1{capsu QD   Fish Oil         

  



     1000 MG 1000 MG 7                     le}       1000 MG           



               00:00:                                              



               00                                                

 

     CholestOff CholestOff       No                       CholestOff      

     



     450  MG 7-                               450 MG           



               00:00:                                              



               00                                                

 

     Co Q10 100 Co Q10 100       No             1{table QD   Co Q10 100   

        



     MG   MG   7-                     t_with_      MG             



               00:00:                     a_meal}                     



                                                               

 

     CholestOff CholestOff       No                       CholestOff      

     



     450  MG 7-                               450 MG           



               00:00:                                              



               00                                                

 

     Fish Oil Fish Oil       No             1{capsu QD   Fish Oil         

  



     1000 MG 1000 MG                      le}       1000 MG           



               00:00:                                              



               00                                                

 

     CholestOff CholestOff       No                       CholestOff      

     



     450  MG 7-                               450 MG           



               00:00:                                              



               00                                                

 

     Fish Oil Fish Oil       No             1{capsu QD   Fish Oil         

  



     1000 MG 1000 MG                      le}       1000 MG           



               00:00:                                              



               00                                                

 

     Co Q10 100 Co Q10 100       No             1{table QD   Co Q10 100   

        



     MG   MG   7-                     t_with_      MG             



               00:00:                     a_meal}                     



               00                                                

 

     Co Q10 100 Co Q10 100       No             1{table QD   Co Q10 100   

        



     MG   MG   7-                     t_with_      MG             



               00:00:                     a_meal}                     



                                                               

 

     Fish Oil Fish Oil       No             1{capsu QD   Fish Oil         

  



     1000 MG 1000 MG 7                     le}       1000 MG           



               00:00:                                              



               00                                                

 

     CholestOff CholestOff       No                       CholestOff      

     



     450  MG 7-                               450 MG           



               00:00:                                              



               00                                                

 

     Co Q10 100 Co Q10 100       No             1{table QD   Co Q10 100   

        



     MG   MG   7-                     t_with_      MG             



               00:00:                     a_meal}                     



               00                                                

 

     Fish Oil Fish Oil       No             1{capsu QD   Fish Oil         

  



     1000 MG 1000 MG 7-                     le}       1000 MG           



               00:00:                                              



               00                                                

 

     FLUZONE      -      Yes                      [facility]           Univ

ers



     HIGH-DOSE      0-16                               ON GIVEN           ity of



     ,      00:00:                                              [place]



     PF, 180      00                                                Medical



     mcg/0.5 mL                                                        Branch

 

     FLUZONE      2017      Yes                      IM[facility]IZATI           Univ

ers



     HIGH-DOSE      0-16                               ON GIVEN           ity of



     ,      00:00:                                              [place]



     PF, 180      00                                                Medical



     mcg/0.5 mL                                                        Branch

 

     FLUZONE      2017      Yes                      IM[facility]UNIZATI           Univ

ers



     HIGH-DOSE      0-16                               ON GIVEN           ity of



     ,      00:00:                                              [place]



     PF, 180      00                                                Medical



     mcg/0.5 mL                                                        Branch

 

     FLUZONE      2017      Yes                      IM[facility]IZATI           Univ

ers



     HIGH-DOSE      0-16                               ON GIVEN           ity of



     ,      00:00:                                              [place]



     PF, 180      00                                                Medical



     mcg/0.5 mL                                                        Branch

 

     FLUZONE      2017      Yes                      IMMUNIZATI           Univ

ers



     HIGH-DOSE      0-16                               ON GIVEN           ity of



     ,      00:00:                                              [place]



     PF, 180      00                                                Medical



     mcg/0.5 mL                                                        Branch

 

     FLUZONE      2017      Yes                      [facility]IZATI           Univ

ers



     HIGH-DOSE      0-16                               ON GIVEN           ity of



     ,      00:00:                                              [place]



     PF, 180      00                                                Medical



     mcg/0.5 mL                                                        Branch

 

     FLUZONE      2017      Yes                      [facility]ATI           Univ

ers



     HIGH-DOSE      0-16                               ON GIVEN           ity of



     ,      00:00:                                              [place]



     PF, 180      00                                                Medical



     mcg/0.5 mL                                                        Branch

 

     FLUZONE      2017      Yes                      [facility]ATI           Univ

ers



     HIGH-DOSE      0-16                               ON GIVEN           ity of



     ,      00:00:                                              [place]



     PF, 180      00                                                Medical



     mcg/0.5 mL                                                        Branch

 

     FLUZONE      2017      Yes                      [facility]ATI           Univ

ers



     HIGH-DOSE      0-16                               ON GIVEN           ity of



     ,      00:00:                                              [place]



     PF, 180      00                                                Medical



     mcg/0.5 mL                                                        Branch

 

     FLUZONE      2017      Yes                      IM[facility]UNIZATI           Univ

ers



     HIGH-DOSE      0-16                               ON GIVEN           ity of



     ,      00:00:                                              [place]



     PF, 180      00                                                Medical



     mcg/0.5 mL                                                        Branch

 

     FLUZONE      2017      Yes                      IM[facility]IZATI           Univ

ers



     HIGH-DOSE      0-16                               ON GIVEN           ity of



     ,      00:00:                                              [place]



     PF, 180      00                                                Medical



     mcg/0.5 mL                                                        Branch

 

     FLUZONE      2017      Yes                      IM[facility]IZATI           Univ

ers



     HIGH-DOSE      0-16                               ON GIVEN           ity of



     ,      00:00:                                              [place]



     PF, 180      00                                                Medical



     mcg/0.5 mL                                                        Branch

 

     FLUZONE      2017      Yes                      [facility]AT[facility]           Univ

ers



     HIGH-DOSE      0-16                               ON GIVEN           ity of



     ,      00:00:                                              [place]



     PF, 180      00                                                Medical



     mcg/0.5 mL                                                        Branch

 

     FLUZONE      2017      Yes                      [facility]           Univ

ers



     HIGH-DOSE      0-16                               ON GIVEN           ity of



     ,      00:00:                                              [place]



     PF, 180      00                                                Medical



     mcg/0.5 mL                                                        Branch

 

     FLUZONE      2017      Yes                      [facility]ATI           Univ

ers



     HIGH-DOSE      0-16                               ON GIVEN           ity of



     ,      00:00:                                              [place]



     PF, 180      00                                                Medical



     mcg/0.5 mL                                                        Branch

 

     FLUZONE      2017      Yes                      IM[facility]IZATI           Univ

ers



     HIGH-DOSE      0-16                               ON GIVEN           ity of



     ,      00:00:                                              [place]



     PF, 180      00                                                Medical



     mcg/0.5 mL                                                        Branch

 

     FLUZONE      2017      Yes                      IM[facility]IZATI           Univ

ers



     HIGH-DOSE      0-16                               ON GIVEN           ity of



     ,      00:00:                                              [place]



     PF, 180      00                                                Medical



     mcg/0.5 mL                                                        Branch

 

     FLUZONE      2017      Yes                      IM[facility]IZATI           Univ

ers



     HIGH-DOSE      0-16                               ON GIVEN           ity of



     ,      00:00:                                              [place]



     PF, 180      00                                                Medical



     mcg/0.5 mL                                                        Branch

 

     FLUZONE      2017      Yes                      IM[facility]UNIZATI           Univ

ers



     HIGH-DOSE      0-16                               ON GIVEN           ity of



     ,      00:00:                                              [place]



     PF, 180      00                                                Medical



     mcg/0.5 mL                                                        Branch

 

     FLUZONE      2017      Yes                      IM[facility]UNIZATI           Univ

ers



     HIGH-DOSE      0-16                               ON GIVEN           ity of



     ,      00:00:                                              [place]



     PF, 180      00                                                Medical



     mcg/0.5 mL                                                        Branch

 

     FLUZONE      2017      Yes                      IM[facility]UNIZATI           Univ

ers



     HIGH-DOSE      0-16                               ON GIVEN           ity of



     ,      00:00:                                              [place]



     PF, 180      00                                                Medical



     mcg/0.5 mL                                                        Branch

 

     FLUZONE      2017      Yes                      IM[facility]UNIZATI           Univ

ers



     HIGH-DOSE      0-16                               ON GIVEN           ity of



     ,      00:00:                                              [place]



     PF, 180      00                                                Medical



     mcg/0.5 mL                                                        Branch

 

     FLUZONE      2017      Yes                      IMMUNIZATI           Univ

ers



     HIGH-DOSE      0-16                               ON GIVEN           ity of



     ,      00:00:                                              [place]



     PF, 180      00                                                Medical



     mcg/0.5 mL                                                        Branch

 

     FLUZONE      2017      Yes                      IMMUNIZATI           Univ

ers



     HIGH-DOSE      0-16                               ON GIVEN           ity of



     ,      00:00:                                              [place]



     PF, 180      00                                                Medical



     mcg/0.5 mL                                                        Branch

 

     [facility]      2017      [facility]



     HIGH-DOSE      0-16                               ON GIVEN           ity of



     ,      00:00:                                              [place]



     PF, 180      00                                                Medical



     mcg/0.5 mL                                                        Branch

 

     Vitamin D Vitamin D           No             1{capsu QD   Vitamin D        

   



     50 MCG 50 MCG                          le}       50 MCG           



     (2000) (2000)                                    (2000)           

 

     Multivitami Multivitami           No             1{table QD   Multivitam   

        



     n Adult - n Adult -                          t}        in Adult -          

 

 

     Divalproex Divalproex           No             1{capsu QD   Divalproex     

      



     Sodium 125 Sodium 125                          le}       Sodium 125        

   



     MG   MG                                      MG             

 

     Rivastigmin Rivastigmin           No             1{patch QD   Rivastigmi   

        



     e 13.3 e 13.3                          _to_ski      ne 13.3           



     MG/24HR MG/24HR                          n}        MG/24HR           

 

     Vitamin D Vitamin D           No             1{capsu QD   Vitamin D        

   



     50 MCG 50 MCG                          le}       50 MCG           



     (2000) (2000)                                    (2000)           

 

     Multivitami Multivitami           No             1{table QD   Multivitam   

        



     n Adult - n Adult -                          t}        in Adult -          

 

 

     Divalproex Divalproex           No             1{capsu QD   Divalproex     

      



     Sodium 125 Sodium 125                          le}       Sodium 125        

   



     MG   MG                                      MG             

 

     Rivastigmin Rivastigmin           No             1{patch QD   Rivastigmi   

        



     e 13.3 e 13.3                          _to_ski      ne 13.3           



     MG/24HR MG/24HR                          n}        MG/24HR           

 

     Rivastigmin Rivastigmin           No             1{patch QD   Rivastigmi   

        



     e 13.3 e 13.3                          _to_ski      ne 13.3           



     MG/24HR MG/24HR                          n}        MG/24HR           

 

     Divalproex Divalproex           No             1{capsu QD   Divalproex     

      



     Sodium 125 Sodium 125                          le}       Sodium 125        

   



     MG   MG                                      MG             

 

     Vitamin D Vitamin D           No             1{capsu QD   Vitamin D        

   



     50 MCG 50 MCG                          le}       50 MCG           



     (2000) (2000)                                    (2000)           

 

     Multivitami Multivitami           No             1{table QD   Multivitam   

        



     n Adult - n Adult -                          t}        in Adult -          

 

 

     Multivitami Multivitami           No             1{table QD   Multivitam   

        



     n Adult - n Adult -                          t}        in Adult -          

 

 

     Vitamin D Vitamin D           No             1{capsu QD   Vitamin D        

   



     50 MCG 50 MCG                          le}       50 MCG           



     (2000) (2000)                                    (2000)           

 

     Rivastigmin Rivastigmin           No             1{patch QD   Rivastigmi   

        



     e 13.3 e 13.3                          _to_ski      ne 13.3           



     MG/24HR MG/24HR                          n}        MG/24HR           

 

     Divalproex Divalproex           No             1{capsu QD   Divalproex     

      



     Sodium 125 Sodium 125                          le}       Sodium 125        

   



     MG   MG                                      MG             

 

     Vitamin D Vitamin D           No             1{capsu QD   Vitamin D        

   



     50 MCG 50 MCG                          le}       50 MCG           



     (2000) (2000)                                    (2000)           

 

     Multivitami Multivitami           No             1{table QD   Multivitam   

        



     n Adult - n Adult -                          t}        in Adult -          

 

 

     Divalproex Divalproex           No             1{capsu QD   Divalproex     

      



     Sodium 125 Sodium 125                          le}       Sodium 125        

   



     MG   MG                                      MG             

 

     Rivastigmin Rivastigmin           No             1{patch QD   Rivastigmi   

        



     e 13.3 e 13.3                          _to_ski      ne 13.3           



     MG/24HR MG/24HR                          n}        MG/24HR           

 

     Vitamin D Vitamin D           No             1{capsu QD   Vitamin D        

   



     50 MCG 50 MCG                          le}       50 MCG           



     (2000) (2000)                                    (2000)           

 

     Multivitami Multivitami           No             1{table QD   Multivitam   

        



     n Adult - n Adult -                          t}        in Adult -          

 

 

     Divalproex Divalproex           No             1{capsu QD   Divalproex     

      



     Sodium 125 Sodium 125                          le}       Sodium 125        

   



     MG   MG                                      MG             

 

     Rivastigmin Rivastigmin           No             1{patch QD   Rivastigmi   

        



     e 13.3 e 13.3                          _to_ski      ne 13.3           



     MG/24HR MG/24HR                          n}        MG/24HR           

 

     Vitamin D Vitamin D           No             1{capsu QD   Vitamin D        

   



     50 MCG 50 MCG                          le}       50 MCG           



     (2000) (2000)                                    (2000)           

 

     Multivitami Multivitami           No             1{table QD   Multivitam   

        



     n Adult - n Adult -                          t}        in Adult -          

 

 

     Memantine Memantine           No             1{table QD   Memantine        

   



     HCl 5 MG HCl 5 MG                          t}        HCl 5 MG           

 

     Memantine Memantine           No             1{table QD   Memantine        

   



     HCl 5 MG HCl 5 MG                          t}        HCl 5 MG           

 

     Vitamin D Vitamin D           No             1{capsu QD   Vitamin D        

   



     50 MCG 50 MCG                          le}       50 MCG           



     (2000) ( [place])                                    (2000)           

 

     Multivitami Multivitami           No             1{table QD   Multivitam   

        



     n Adult - n Adult -                          t}        in Adult -          

 







Immunizations







           Ordered    Filled Immunization Date       Status     Comments   Sour

e



           Immunization Name Name                                        

 

           FLUZONE HIGH DOSE FLUZONE HIGH DOSE 2022-10-14 Completed             

[facility] -



           OVER 65    OVER 65    09:24:00                         [facility]

 

           FLUZONE HIGH DOSE FLUZONE HIGH DOSE 2022-10-14 Completed             

[facility] -



           OVER 65    OVER 65    09:24:00                         [facility]

 

           FLUZONE HIGH DOSE FLUZONE HIGH DOSE 2021 Completed             

[facility] -



           OVER 65    OVER 65    11:47:00                         [facility]

 

           FLUZONE HIGH DOSE FLUZONE HIGH DOSE 2021 Completed             

[facility] -



           OVER 65    OVER 65    11:47:00                         [facility]

 

           FLUZONE HIGH DOSE FLUZONE HIGH DOSE 2021 Completed             

[facility] -



           OVER 65    OVER 65    11:47:00                         [facility]

 

           FLUZONE HIGH DOSE FLUZONE HIGH DOSE 2021 Completed             

[facility] -



           OVER 65    OVER 65    11:47:00                         [facility]

 

           FLUZONE HIGH DOSE FLUZONE HIGH DOSE 2021 Completed             

[facility] -



           OVER 65    OVER 65    11:47:00                         [facility]

 

           FLUZONE HIGH DOSE FLUZONE HIGH DOSE 2021 Completed             

[facility] -



           OVER 65    OVER 65    11:47:00                         [facility]

 

           FLUZONE HIGH DOSE FLUZONE HIGH DOSE 2021 Completed             

[facility] -



           OVER 65    OVER 65    11:47:00                         [facility]

 

           FLUZONE HIGH DOSE FLUZONE HIGH DOSE 2021 Completed             

[facility] -



           OVER 65    OVER 65    11:47:00                         [facility]

 

           SARS-COV-2 COVID-19            2021 Completed             Unive

rsity of



           MODERNA 12+ YRS            00:00:00                         [place] [facility]

ical



           VACCINE                                                Branch

 

           SARS-COV-2 COVID-19            2021 Completed             Unive

rsity of



           MODERNA 12+ YRS            00:00:00                         [place] [facility]

ical



           VACCINE                                                Branch

 

           SARS-COV-2 COVID-19            2021 Completed             Unive

rsity of



           MODERNA 12+ YRS            00:00:00                         [place] [facility]

ical



           VACCINE                                                Branch

 

           SARS-COV-2 COVID-19            2021 Completed             Unive

rsity of



           MODERNA 12+ YRS            00:00:00                         [place] [facility]

ical



           VACCINE                                                Branch

 

           SARS-COV-2 COVID-19            2021 Completed             Unive

rsity of



           MODERNA 12+ YRS            00:00:00                         [place] [facility]

ical



           VACCINE                                                Branch

 

           SARS-COV-2 COVID-19            2021 Completed             Unive

rsity of



           MODERNA 12+ YRS            00:00:00                         [place] [facility]

ical



           VACCINE                                                Branch

 

           SARS-COV-2 COVID-19            2021 Completed             Unive

rsity of



           MODERNA 12+ YRS            00:00:00                         [place] [facility]

ical



           VACCINE                                                Branch

 

           SARS-COV-2 COVID-19            2021 Completed             Unive

rsity of



           MODERNA 12+ YRS            00:00:00                         [place] [facility]

ical



           VACCINE                                                Branch

 

           SARS-COV-2 COVID-19            2021 Completed             Unive

rsity of



           MODERNA 12+ YRS            00:00:00                         [place] [facility]

ical



           VACCINE                                                Branch

 

           SARS-COV-2 COVID-19            2021 Completed             Unive

rsity of



           MODERNA 12+ YRS            00:00:00                         [place] [facility]

ical



           VACCINE                                                Branch

 

           SARS-COV-2 COVID-19            2021 Completed             Unive

rsity of



           MODERNA 12+ YRS            00:00:00                         [place] [facility]

ical



           VACCINE                                                Branch

 

           SARS-COV-2 COVID-19            2021 Completed             Unive

rsity of



           MODERNA 12+ YRS            00:00:00                         [place] [facility]

ical



           VACCINE                                                Branch

 

           SARS-COV-2 COVID-19            2021 Completed             Unive

rsity of



           MODERNA 12+ YRS            00:00:00                         [place] [facility]

ical



           VACCINE                                                Branch

 

           SARS-COV-2 COVID-19            2021 Completed             Unive

rsity of



           MODERNA 12+ YRS            00:00:00                         [place] [facility]

ical



           VACCINE                                                Branch

 

           SARS-COV-2 COVID-19            2021 Completed             Unive

rsity of



           MODERNA 12+ YRS            00:00:00                         [place] [facility]

ical



           VACCINE                                                Branch

 

           SARS-COV-2 COVID-19            2021 Completed             Unive

rsity of



           MODERNA 12+ YRS            00:00:00                         [place] [facility]

[facility]



           VACCINE                                                Branch

 

           SARS-COV-2 COVID-19            2021 Completed             Unive

rsity of



           MODERNA 12+ YRS            00:00:00                         [place] [facility]

[facility]



           VACCINE                                                Branch

 

           SARS-COV-2 COVID-19            2021 Completed             Unive

rsity of



           MODERNA 12+ YRS            00:00:00                         [place] [facility]

ical



           VACCINE                                                Branch

 

           SARS-COV-2 COVID-19            2021 Completed             Unive

rsity of



           MODERNA 12+ YRS            00:00:00                         [place] [facility]

ical



           VACCINE                                                Branch

 

           SARS-COV-2 COVID-19            2021 Completed             Unive

rsity of



           MODERNA 12+ YRS            00:00:00                         [place] [facility]

ical



           VACCINE                                                Branch

 

           SARS-COV-2 COVID-19            2021 Completed             Unive

rsity of



           MODERNA 12+ YRS            00:00:00                         [place] [facility]

[facility]



           VACCINE                                                Branch

 

           SARS-COV-2 COVID-19            2021 Completed             Unive

rsity of



           MODERNA 12+ YRS            00:00:00                         [place] [facility]

[facility]



           VACCINE                                                Branch

 

           SARS-COV-2 COVID-19            2021 Completed             Unive

rsity of



           MODERNA 12+ YRS            00:00:00                         [place] [facility]

ical



           VACCINE                                                Branch

 

           SARS-COV-2 COVID-19            2021 Completed             Unive

rsity of



           MODERNA 12+ YRS            00:00:00                         [place] [facility]

ical



           VACCINE                                                Branch

 

           SARS-COV-2 COVID-19            2021 Completed             Unive

rsity of



           MODERNA 12+ YRS            00:00:00                         [place] [facility]

[facility]



           VACCINE                                                Branch

 

           SARS-COV-2 COVID-19            2021 Completed             Unive

rsity of



           MODERNA 12+ YRS            00:00:00                         [place] [facility]

ical



           VACCINE                                                Branch

 

           SARS-COV-2 COVID-19            2021 Completed             Unive

rsity of



           MODERNA 12+ YRS            00:00:00                         [place] [facility]

ical



           VACCINE                                                Branch

 

           SARS-COV-2 COVID-19            2021 Completed             Unive

rsity of



           MODERNA 12+ YRS            00:00:00                         [place] [facility]

ical



           VACCINE                                                Branch

 

           SARS-COV-2 COVID-19            2021 Completed             Unive

rsity of



           MODERNA 12+ YRS            00:00:00                         [place] [facility]

ical



           VACCINE                                                Branch

 

           SARS-COV-2 COVID-19            2021 Completed             Unive

rsity of



           MODERNA 12+ YRS            00:00:00                         [place] [facility]

ical



           VACCINE                                                Branch

 

           SARS-COV-2 COVID-19            2021 Completed             Unive

rsity of



           MODERNA 12+ YRS            00:00:00                         [place] [facility]

ical



           VACCINE                                                Branch

 

           SARS-COV-2 COVID-19            2021 Completed             Unive

rsity of



           MODERNA 12+ YRS            00:00:00                         [place] [facility]

ical



           VACCINE                                                Branch

 

           SARS-COV-2 COVID-19            2021 Completed             Unive

rsity of



           MODERNA 12+ YRS            00:00:00                         [place] [facility]

ical



           VACCINE                                                Branch

 

           SARS-COV-2 COVID-19            2021 Completed             Unive

rsity of



           MODERNA 12+ YRS            00:00:00                         [place] [facility]

ical



           VACCINE                                                Branch

 

           SARS-COV-2 COVID-19            2021 Completed             Unive

rsity of



           MODERNA 12+ YRS            00:00:00                         [place] [facility]

ical



           VACCINE                                                Branch

 

           SARS-COV-2 COVID-19            2021 Completed             Unive

rsity of



           MODERNA 12+ YRS            00:00:00                         [place] [facility]

ical



           VACCINE                                                Branch

 

           SARS-COV-2 COVID-19            2021 Completed             Unive

rsity of



           MODERNA 12+ YRS            00:00:00                         [place] [facility]

ical



           VACCINE                                                Branch

 

           SARS-COV-2 COVID-19            2021 Completed             Unive

rsity of



           MODERNA 12+ YRS            00:00:00                         [place] [facility]

ical



           VACCINE                                                Branch

 

           SARS-COV-2 COVID-19            2021 Completed             Unive

rsity of



           MODERNA 12+ YRS            00:00:00                         [place] [facility]

ical



           VACCINE                                                Branch

 

           SARS-COV-2 COVID-19            2021 Completed             Unive

rsity of



           MODERNA 12+ YRS            00:00:00                         [place] [facility]

ical



           VACCINE                                                Branch

 

           SARS-COV-2 COVID-19            2021 Completed             Unive

rsity of



           MODERNA 12+ YRS            00:00:00                         [place] [facility]

ical



           VACCINE                                                Branch

 

           SARS-COV-2 COVID-19            2021 Completed             Unive

rsity of



           MODERNA 12+ YRS            00:00:00                         [place] [facility]

ical



           VACCINE                                                Branch

 

           SARS-COV-2 COVID-19            2021 Completed             Unive

rsity of



           MODERNA 12+ YRS            00:00:00                         [place] [facility]

ical



           VACCINE                                                Branch

 

           SARS-COV-2 COVID-19            2021 Completed             Unive

rsity of



           MODERNA 12+ YRS            00:00:00                         [place] [facility]

ical



           VACCINE                                                Branch

 

           SARS-COV-2 COVID-19            2021 Completed             Unive

rsity of



           MODERNA 12+ YRS            00:00:00                         [place] [facility]

ical



           VACCINE                                                Branch

 

           SARS-COV-2 COVID-19            2021 Completed             Unive

rsity of



           MODERNA 12+ YRS            00:00:00                         [place] [facility]

ical



           VACCINE                                                Branch

 

           SARS-COV-2 COVID-19            2021 Completed             Unive

rsity of



           MODERNA 12+ YRS            00:00:00                         [place] [facility]

ical



           VACCINE                                                Branch

 

           SARS-COV-2 COVID-19            2021 Completed             Unive

rsity of



           MODERNA 12+ YRS            00:00:00                         [place] [facility]

ical



           VACCINE                                                Branch

 

           SARS-COV-2 COVID-19            2021 Completed             Unive

rsity of



           MODERNA 12+ YRS            00:00:00                         [place] [facility]

ical



           VACCINE                                                Branch

 

           SARS-COV-2 COVID-19            2021 Completed             Unive

rsity of



           MODERNA 12+ YRS            00:00:00                         [place] [facility]

ical



           VACCINE                                                Branch

 

           Prevnar 13 Prevnar 13 2018 Completed             [facility] 

-



           -Pneumonia Vaccine -Pneumonia Vaccine 15:06:00                       

  [facility]

 

           Prevnar 13 Prevnar 13 2018 Completed             [facility] 

-



           -Pneumonia Vaccine -Pneumonia Vaccine 15:06:00                       

  [facility]

 

           Prevnar 13 Prevnar 13 2018 Completed             [facility] 

-



           -Pneumonia Vaccine -Pneumonia Vaccine 15:06:00                       

  [facility]

 

           Prevnar 13 Prevnar 13 2018 Completed             [facility] 

-



           -Pneumonia Vaccine -Pneumonia Vaccine 15:06:00                       

  [facility]

 

           Prevnar 13 Prevnar 13 2018 Completed             [facility] 

-



           -Pneumonia Vaccine -Pneumonia Vaccine 15:06:00                       

  [facility]

 

           Prevnar 13 Prevnar 13 2018 Completed             [facility] 

-



           -Pneumonia Vaccine -Pneumonia Vaccine 15:06:00                       

  [facility]

 

           Prevnar 13 Prevnar 13 2018 Completed             [facility] 

-



           -Pneumonia Vaccine -Pneumonia Vaccine 15:06:00                       

  [facility]

 

           Prevnar 13 Prevnar 13 2018 Completed             [facility] 

-



           -Pneumonia Vaccine -Pneumonia Vaccine 15:06:00                       

  [facility]

 

           Prevnar 13 Prevnar 13 2018 Completed             [facility] 

-



           -Pneumonia Vaccine -Pneumonia Vaccine 00:00:00                       

  [facility]







Vital Signs







             Vital Name   Observation Time Observation Value Comments     Source

 

             height       2022-10-26 16:00:00 68 [in_i]                 [facility]

 

             weight       2022-10-26 16:00:00 145 [lb_av]               [facility]

 

             bmi          2022-10-26 16:00:00 22.04 kg/m2               [facility]

 

             height       2022 11:00:00 68 [in_i]                 [facility]

 

             weight       2022 11:00:00 146 [lb_av]               [facility]

 

             temperature  2022 11:00:00 97.1 [degF]               [facility]

 

             bmi          2022 11:00:00 22.2 kg/m2                [facility]

 

             oximetry     2022 11:00:00 100 %                     [facility]

 

             respiratory rate 2022 11:00:00 18 /min                   Comm

on [facility]

 

             blood pressure 2022 11:00:00 131 mm[Hg]                [facility] -



             systolic                                            [facility]

 

             blood pressure 2022 11:00:00 63 mm[Hg]                 [facility] -



             diastolic                                           [facility]

 

             height       2021 11:40:00 68 [in_i]                 [facility]

 

             weight       2021 11:40:00 161 [lb_av]               [facility]

 

             temperature  2021 11:40:00 98.2 [degF]               [facility]

 

             bmi          2021 11:40:00 24.48 kg/m2               [facility]







Procedures







                Procedure       Date / Time Performed Performing Clinician Sourc

e

 

                OP CORRESPONDENCE 2022-10-24 05:01:00 Doctor Unassigned, No Univ

ersity of [facility] - OTHER 2022 05:01:00 Doctor Unassigned, No Un

iversity of 

[facility] - OTHER 2022-09-15 05:01:00 Doctor Unassigned, No Un

iversity of 

[facility]ALTH 485 2022-08-15 05:01:00 Doctor Unassigned, No Univer

sity of [facility] [place]







Encounters







        Start   End     Encounter Admission Attending Care    Care    Encounter 

Source



        Date/Time Date/Time Type    Type    Clinicians Facility Department ID   

   

 

        2022         Outpatient         Marcela  [facility]  [id number]

 Common



        10:22:00                         Sheri                   [id number]   [facility]

 

        2022 Telephone         [facility]    [id number] [id number]

[id number] Univers



        00:00:00 00:00:00                 [facility]  [id number]       

  ity of



                                                [place] [id number]         [place]

as



                                                WILLIS?BLEA [phone]         [address]



                                                MEDICAL                 



                                                OFFICE                  



                                                BUILDING                 

 

        2022 Outpatient R       JIM SHEFFIELD [facility]    [id number]

[id number] Univers



        14:00:00 14:08:55                 JIM SHEFFIELD                         i

ty of



                                                                        [facility]

 

        2022 Office          STEWART [place] [id number]

[id number] Univers



        14:00:00 14:08:55 Visit           [facility] [id number]         i

ty of



                                                [facility] [id number]         [place]

[place]



                                                        [phone]         [address]

 

        2022 Telephone         [facility]    [id number] [id number]

[id number] Univers



        00:00:00 00:00:00                 [facility]  [id number]       

  ity of



                                                [facility] [id number]         [place]

as



                                                WILLIS?BLEA [phone]         [address]



                                                MEDICAL                 



                                                OFFICE                  



                                                BUILDING                 

 

        2022 Telephone         [facility]    1.2.840.114 [id number]

[id number] Univers



        00:00:00 00:00:00                 [facility]  [id number]       

  ity of



                                                [place] [id number]         [place]

as



                                                WILLIS?BLEA [phone]         [address]                 

 

        2022-11-15 2022-11-15 Telephone         [facility]    1.2.840.114 [id number]

[id number] Univers



        00:00:00 00:00:00                 [facility]  [id number]       

  ity of



                                                [place] [id number]         [place]

as



                                                WILLIS?BLEA [phone]         [address]                 

 

        2022 Telephone         [facility]    1.2.840.114 [id number]

[id number] Univers



        00:00:00 00:00:00                 [facility]  [id number]       

  ity of



                                                [place] [id number]         [place]

as



                                                WILLIS?BLEA [phone]         [address]                 

 

        2022 [facility]    1.2.840.114 [id number]

[id number] Univers



        00:00:00 00:00:00                 [facility]  [id number]       

  ity of



                                                [place] [id number]         [place]

as



                                                WILLIS?BLEA [phone]         [address]                 

 

        2022 Telephone         [facility]    1.2.840.114 [id number]

[id number] Univers



        00:00:00 00:00:00                 [facility]  [id number]       

  ity of



                                                [place] [id number]         [place]

as



                                                WILLIS?BLEA [phone]         [address]                 

 

        2022-10-31 2022-10-31 Telephone         [facility]    1.2.840.114 [id number]

[id number] Univers



        00:00:00 00:00:00                 [facility]  [id number]       

  ity of



                                                [place] [id number]         [place]

as



                                                WILLIS?BLEA [phone]         [address]                 

 

        2022-10-27 2022-10-27 Telephone         [facility]    1.2.840.114 [id number]

[id number] Univers



        00:00:00 00:00:00                 [facility]  [id number]       

  ity of



                                                [place] [id number]         [place]

as



                                                WILLIS?BLEA [phone]         [address]                 



                                                OFFICE                  



                                                [facility]                 

 

        2022-10-26 2022-10-26 SUB ANNUAL                 [facility]  [id number]

 Common



        00:00:00 00:00:00 [facility]                                             [facility]



                        WELLNESS                                         - [facility]



                        VISIT                                           [facility]

 

        2022-10-25 2022-10-25 Telephone         [facility]    1.2.840.114 [id number]

[id number] Univers



        00:00:00 00:00:00                 [facility]  [id number]       

  ity of



                                                [place] [id number]         [place]

as



                                                WILLIS?BLEA [phone]         [address]                 



                                                OFFICE                  



                                                [facility]                 

 

        2022-10-24 2022-10-24 Orders          Doctor  VALENCIA    1.2.840.114 [id number]

[id number] Univers



        00:00:00 00:00:00 Only            Unassigned, PALLAVI   350.1.13.10       

  ity of



                                        [facility] [id number]         [place]

as



                                                        [phone]         [address]

 

        2022-10-19 2022-10-19 Telephone         [facility]    1.2.840.114 [id number]

[id number] Univers



        00:00:00 00:00:00                 [facility] [id number]       

  ity of



                                                CARE    [id number]         [place]

[place]



                                                [place] [phone]         [address]

 

        2022-10-18 2022-10-18 Telephone         [facility]    1.2.840.114 [id number]

[id number] Univers



        00:00:00 00:00:00                 [facility]  350.1.13.10       

  ity of



                                                [place] [id number]         [place]

as



                                                WILLIS?BLEA [phone]         [address]                 



                                                OFFICE                  



                                                [facility]                 

 

        2022-10-14 2022-10-14 OFFICE                  [facility]  [id number] Co

mmon



        00:00:00 00:00:00 VISIT                                           [facility]



                        ESTAB PT                                         - [facility]



                        LEVEL 2                                         [facility]

 

        2022-10-13 2022-10-13 Telephone         [facility]    1.2.840.114 [id number]

[id number] Univers



        00:00:00 00:00:00                 [facility] [id number]       

  ity of



                                                CARE    [id number]         [place]

[place]



                                                [place] [phone]         [address]

 

        2022-10-10 2022-10-10 Telephone         [facility]    1.2.840.114 [id number]

[id number] Univers



        00:00:00 00:00:00                 [facility]  350.1.13.10       

  ity of



                                                [place] [id number]         [place]

as



                                                WILLIS?BLEA [phone]         [address]



                                                MEDICAL                 



                                                OFFICE                  



                                                [facility]                 

 

        2022-10-10 2022-10-10 (TEL)                   [facility]  [id number] Co

mmon



        00:00:00 00:00:00                                                 [facility]



                                                                        - [facility]



                                                                        [facility]

 

        2022 Orders          Doctor  VALENCIA    1.2.840.114 [id number]

[id number] Univers



        00:00:00 00:00:00 Only            Unassigned, PALLAVI   350.1.13.10       

  ity of



                                        [facility] [id number]         [place]

as



                                                        [phone]         [address]

 

        2022 OFFICE                  [facility]  [id number] Co

mmon



        00:00:00 00:00:00 VISIT EST                                         [facility]

it



                        PT LEVEL 3                                         - [facility]



                                                                        [facility]

 

        2022 Telephone         [facility]    1.2.840.114 [id number]

[id number] Univers



        00:00:00 00:00:00                 [facility]  350.1.13.10       

  ity of



                                                [place] [id number]         [place]

as



                                                WILLIS?BLEA [phone]         [address]



                                                MEDICAL                 



                                                OFFICE                  



                                                [facility]                 

 

        2022-09-15 2022-09-15 Orders          Doctor  VALENCIA    1.2.840.114 [id number]

15 Univers



        00:00:00 00:00:00 Only            Unassigned, PALLAVI   350.1.13.10       

  ity of



                                        [facility] [id number]         [place]

as



                                                        [phone]         [address]

 

        2022 Telephone         [facility]    1.2.840.114 [id number]

[id number] Univers



        00:00:00 00:00:00                 [facility]  350.1.13.10       

  ity of



                                                [place] 4.2.7.2.686         [place]

as



                                                WILLIS?BLEA [phone]         [address]



                                                MEDICAL                 



                                                OFFICE                  



                                                BUILDING                 

 

        2022 Patient         Miguel Angel  [facility]    1.2.840.114 [id number]

[id number] Univers



        00:00:00 00:00:00 Outreach         [facility]  350.1.13.10         i

ty of



                                                [place] 4.2.7.2.686         [place]

as



                                                WILLIS?BLEA [phone]         [address]                 



                                                OFFICE                  



                                                [facility]                 

 

        2022 Patient         Miguel Angel  [facility]    1.2.840.114 [id number]

01 Univers



        00:00:00 00:00:00 Outreach         [facility]  350.1.13.10         i

ty of



                                                [place] 4.2.7.2.686         [place]

as



                                                WILLIS?BLEA [phone]         [address]                 



                                                OFFICE                  



                                                [facility]                 

 

        2022 Office          Kalli [facility]    1.2.840.114 [id number]

873 Univers



        10:40:00 13:01:52 Visit           [facility]  350.1.13.10       

  ity of



                                                [place] 4.2.7.2.686         [place]

as



                                                WILLIS?BLEA [phone]         [address]                 



                                                OFFICE                  



                                                [facility]                 

 

        2022 Outpatient ZION DAUGHERTY [facility]   

 [id number] Univers



        10:40:00 13:01:52                 ZION MAHAN [facility]

 

        2022 Outpatient ZION DAUGHERTY [facility]   

 [id number] Univers



        10:40:00 10:40:00                 ZION MAHAN [facility]

 

        2022 Orders          Doctor  VALENCIA    1.2.840.114 [id number]

07 Univers



        00:00:00 00:00:00 Only            Unassigned, PALLAVI   350.1.13.10       

  ity of



                                        [facility] 4.2.7.2.686         [place]

as



                                                        [phone]         [address]

 

        2022 [facility]ill          [facility]    1.2.840.114 [id number]

021 Univers



        00:00:00 00:00:00                 [facility]  350.1.13.10       

  ity of



                                                [facility] 4.2.7.2.686         [place]

as



                                                WILLIS?BLEA [phone]         [address]                 



                                                OFFICE                  



                                                [facility]                 

 

        2022 Telephone         [facility]    1.2.840.114 [id number]

[id number] Univers



        00:00:00 00:00:00                 [facility]  350.1.13.10       

  ity of



                                                [facility] 4.2.7.2.686         [place]

as



                                                WILLIS?BLEA [phone]         [address]                 



                                                OFFICE                  



                                                [facility]                 

 

        2022 Telephone         [facility]    1.2.840.114 [id number]

[id number] Univers



        00:00:00 00:00:00                 [facility]  350.1.13.10       

  ity of



                                                [facility] 4.2.7.2.686         [place]

as



                                                WILLIS?BLEA [phone]         [address]                 



                                                OFFICE                  



                                                [facility]                 

 

        2022-08-15 2022-08-15 Orders          Doctor [place]    1.2.840.114 [id number]

93 Univers



        00:00:00 00:00:00 Only            Unassigned, PALLAVI   350.1.13.10       

  ity of



                                        [facility] 4.2.7.2.686         [place]

as



                                                        [phone]         [address]

 

        2022 Office          STEWART [place] 1.2.504.347 [id number]

8369 Univers



        15:15:00 15:44:33 Visit           [facility] 350.1.13.10         i

ty of



                                                CL[place] 4.2.7.2.686         [place]

[place]



                                                        [phone]         [address]

 

        2022 Outpatient R       JIM SHEFFIELD [facility]    10

[id number] Univers



        15:15:00 15:44:33                 JIM SHEFFIELD i

ty of



                                                                        [facility]

 

        2022 Outpatient R       JIM SHEFFIELD [facility]    10

[id number] Univers



        15:15:00 15:15:00                 NETTIEANTONIANT                         stephany

ty of



                                                                        [facility]

 

        2022 Outpatient R       JIM SHEFFIELD [facility]    10

[id number] Univers



        15:15:00 15:15:00                 JIM SHEFFIELD i

ty [facility]

 

        2022 Refill          [facility]    1.2.840.114 [id number]

193 Univers



        00:00:00 00:00:00                 [facility]  350.1.13.10       

  ity of



                                                [place] 4.2.7.2.686         [place]

as



                                                WILLIS?BLEA [phone]         Me

majoral



                                                MI[address]



                                                MEDICAL                 



                                                OFFICE                  



                                                BUILDING                 

 

        2022 Outpatient R       JIM SHEFFIELD [facility]    10

[id number] Univers



        14:00:00 14:00:00                 JIM SHEFFIELD i [facility]

 

        2022-02-15 2022-02-15 (TEL)                   [facility]  [id number] Co

mmon



        00:00:00 00:00:00                                                 [facility]

 

        2021 Orders          Doctor [place]    1.2.840.114 [id number]

85 Univers



        00:00:00 00:00:00 Only            Unassigned, PALLAVI   350.1.13.10       

  ity of



                                        [place] [facility] 4.2.7.2.686         [place]

as



                                                        [phone]         Medi

sanjay



                                                        009             Br[place]

 

        2021-11-10 2021-11-10 Telephone         Kalli [facility]    1.2.840.114 [id number]

[id number] Univers



        00:00:00 00:00:00                 Zion [facility] 350.1.13.10      

   ity of



                                                [place] 4.2.7.2.686         [place]

[place]



                                                PROFESSIO [phone]         Me

dicalexi BELLO     092             Branch



                                                BUILDING                 

 

        2021 OFFICE                  [facility]  [id number] Co

mmon



        00:00:00 00:00:00 VISIT                                           Spirit



                        ESTAB PT                                         - [facility]



                        LEVEL 1                                         [facility]

 

        2021-10-25 2021-10-25 Outpatient JIM IGLESIAS [facility]    [id number]

[id number] Univers



        14:30:00 14:54:02                 JIM SHEFFIELD                         i

ty of



                                                                        [facility]

 

        2021-10-25 2021-10-25 Office          STEWART [place] 1.2.537.626 [id number]

2519 Univers



        14:10:34 14:54:02 Visit           Jim [facility] 350.1.13.10         i

ty of



                                                [facility] 4.2.7.2.686         [place]

[place]



                                                        [phone]         [address]

 

        2021-09-10 2021-09-10 [facility]    1.2.840.114 [id number]

821 Univers



        00:00:00 00:00:00                 [facility]  350.1.13.10       

  ity of



                                                [place] 4.2.7.2.686         [place]

as



                                                Willis?Blea [phone]         [address]                 



                                                Office                  



                                                [facility]                 

 

        2021 Office          Kalli [facility]    1.2.840.114 [id number]

131 Univers



        10:22:10 13:17:22 Visit           Zion [facility]  350.1.13.10       

  ity of



                                                [place] 4.2.7.2.686         [place]

as



                                                Willis?Blea [phone]         [address]                 



                                                Office                  



                                                [facility]                 

 

        2021 Outpatient ZION DAUGHERTY [facility]   

 [id number] Univers



        10:40:00 10:40:00                 ZION MAHAN                       

  ity [facility]

 

        2021 Outpatient                 [facility]  [id number]

 Common



        00:00:00 00:00:00                                                 Spirit



                                                                        - [facility]



                                                                        [facility]

 

        2021-08-10 2021-08-10 Refill          [facility]    1.2.840.114 [id number]

238 Univers



        00:00:00 00:00:00                 [facility] 350.1.13.10      

   ity of



                                                [place] 4.2.7.2.686         [place]

[place]



                                                [place] [phone]         Me

dical



                                                nal     092             [facility]                 

 

        2021-08-10 2021-08-10 Refill          [facility]    1.2.840.114 [id number]

914 Univers



        00:00:00 00:00:00                 Zion Shin 350.1.13.10      

   ity of



                                                [place] 4.2.7.2.686         [place]

[place]



                                                Pro[place] [phone]         Me

dical



                                                nal     092             [facility]                 

 

        2021 Office          [facility] 1.2.474.757 [id number]

[id number] Univers



        12:46:02 13:16:02 Visit           Nettie GALINDO       350.1.13.10         it

y of



                                                [facility] 4.2.7.2.686         [place]

as



                                                BANK    [phone]         Medi

sanjay



                                                BLDG.   144             [place]

 

        2021 Outpatient R       JOSE  [facility]    [id number]

[id number] Univers



        13:00:00 13:00:00                 NETTIE peter [facility]

 

        2021 Telephone         [facility]    1.2.840.114 [id number]

[id number] Univers



        00:00:00 00:00:00                 Zion Shin 350.1.13.10      

   ity of



                                                [place] 4.2.7.2.686         [place]

[place]



                                                Profess[place] [phone]         [facility]



                                                nal     092             [facility]                 

 

        2021-07-15 2021-07-15 [facility]         [facility]    1.2.864.587 [id number]

[id number] Univers



        09:53:57 23:59:00 Encounter         Zion Shin 350.1.13.10    

     ity of



                                                [place] 4.2.7.2.686         [place]

[place]



                                                [place]  [phone]         Medi

sanjay



                                                        804             [place]

 

        2021-07-15 2021-07-15 Outpatient ZION DAUGHERTY [facility]   

 [id number] Univers



        00:00:00 00:00:00                 ZION MAHAN [facility]

 

        2021-07-15 2021-07-15 Telephone         [facility]    1.2.840.114 [id number]

[id number] Univers



        00:00:00 00:00:00                 Zion Shin 350.1.13.10      

   ity of



                                                [place] 4.2.7.2.686         [place]

s



                                                Profess[place] [phone]         [address]                 

 

        2021 Office          Kalli[facility]    1.2.840.114 [id number]

733 Univers



        08:06:15 09:22:13 Visit           Zion Medhat Shin 350.1.13.10      

   ity of



                                                [place] 4.2.7.2.686         [place]

s



                                                [place]fess[place] [phone]         [address]                 

 

        2021 Outpatient R       ZION MAHAN [facility]   

 [id number] Univers



        08:00:00 08:00:00                 ZION MAHAN                       

  ity of



                                                                        [facility]

 

        2021 Orders          Doctor  VALENCIA Kaur2.840.114 [id number]

34 Univers



        00:00:00 00:00:00 Only            Unassigned, PALLAVI   350.1.13.10       

  ity of



                                        [facility] 4.2.7.2.686         [place]

as



                                                        [phone]         [address]

 

        2021 Orders          Doctor  VALENCIA    1.2.840.114 [id number]

44 Univers



        00:00:00 00:00:00 Only            Unassigned, PALLAVI   350.1.13.10       

  ity of



                                        [facility] 4.2.7.2.686         [place]

as



                                                        [phone]         [address]

 

        2021 Outpatient                 [facility]LML[facility]  [facility]  [id number]

 Common



        00:00:00 00:00:00                                                 [facility]

 

        2021 Outpatient                 STLMLC  STLMLC  [id number]

 Common



        00:00:00 00:00:00                                                 [facility]

 

        2021 Orders          Doctor VALENCIA Kaur2.840.114 [id number]

17 Univers



        00:00:00 00:00:00 Only            Unassigned, PALLAVI   350.1.13.10       

  ity of



                                        [facility] 4.2.7.2.686         [place]

as



                                                        [phone]         [address]

 

        2021 Orders          Doctor  VALENCIA    1.2.840.114 [id number]

15 Univers



        00:00:00 00:00:00 Only            Unassigned, PALLAVI   350.1.13.10       

  ity of



                                        [facility] 4.2.7.2.686         [place]

as



                                                        [phone]         [address]

 

        2021 Outpatient                 STL[facility]  STL[facility]C  [id number]

 Common



        00:00:00 00:00:00                                                 [facility]

 

        2021 Orders          Doctor  VALENCIA    1.2.840.114 [id number]

13 Univers



        00:00:00 00:00:00 Only            Unassigned, PALLAVI   350.1.13.10       

  ity of



                                        [facility] 4.2.7.2.686         [place]

as



                                                        [phone]         [address]

 

        2021 Outpatient ALISIA TYSON  [facility]    [id number]

[id number] Univers



        16:15:00 16:15:00                 ELLY                           [facility]

 

        2021 Office          [facility]    1.2.840.114 [id number]

54 Univers



        15:50:12 16:05:12 Visit           Elly [facility]  350.1.13.10         it

y of



                                                Surgical 4.2.7.2.686         [place]

as



                                                Specialti [phone]         Me

dical



                                                es      198             [facility]                 

 

        2021 Outpatient                 [facility]    [id number]

[id number] Univers



        12:10:00 12:10:00                                                 ity [facility]

 

        2021 Outpatient                 [facility]    [id number]

387 Univers



        12:15:00 12:15:00                                                 ity [facility]

 

        2020-10-26 2020-10-26 Office          STEWART [place] 1.2.930.418 [id number]

9089 Univers



        14:04:21 14:55:02 Visit           Jim [facility] 350.1.13.10         i

ty of



                                                [facility] 4.2.7.2.686         [place]

[place]



                                                        [phone]         [address]

 

        2020-10-26 2020-10-26 Outpatient JIM IGLESIAS [facility]    10

[id number] Univers



        14:00:00 14:00:00                 JIM SHEFFIELD i

ty [facility]

 

        2020-10-26 2020-10-26 Outpatient R       JIM SHEFFIELD [facility]    10

[id number] Univers



        14:00:00 14:00:00                 JMI SHEFFIELD i

ty [facility]

 

        2020 Outpatient                 [id number]

[id number] Common



        15:00:00 15:00:00                         [facility]

it



                                                Road    [facility]

 

        2020 Outpatient                 [facility] 26

[id number] Common



        11:00:00 11:00:00                         [facility]

it



                                                Road    [facility]

 

        2020 [facility]    1.2.840.114 [id number]

[id number] Univers



        12:43:00 23:59:00 Encounter         Eduardo SHANE [place] 350.1.13.10        

 ity [facility] 4.2.7.2.686         [facility]  [phone]         Medi

sanjay



                                                        [address] [place] [address]

 

        2020 Outpatient R       MARBELLA  [facility]    [id number]

[id number] Univers



        13:45:00 13:45:00                 ELLY peter [facility]

 

        2020 Office          [facility]    1.2.840.114 [id number]

86 Univers



        13:12:43 13:27:43 Visit           [facility]  350.1.13.10         it

y of



                                                Surgical 4.2.7.2.686         [place]

as



                                                Specialti [phone]         Me

dical



                                                es      198             [facility]                 

 

        2020 Telephone         [facility]    1.2.840.114 [id number]

[id number] Univers



        00:00:00 00:00:00                 Eduardo ACOSTA [facility]  350.1.13.10         it

y of



                                                Surgical 4.2.7.2.686         [place]

as



                                                Specialti [phone]         Me

dical



                                                es      198             [facility]                 

 

        2020 Orders          Doctor [place]    1.2.840.114 [id number]

54 Univers



        00:00:00 00:00:00 Only            Unassigned, PALLAVI   350.1.13.10       

  ity of



                                        [place] [facility] [place] [facility] 4.2.7.2.686         [place]

as



                                                        [phone]         Medi

sanjay



                                                        009             Br[place]

 

        2019 Outpatient                 [facility]

[id number] Common



        16:40:00 16:40:00                         [address]         Spir

it



                                                Road    [facility]

 

        2019 Outpatient                 [facility]

[id number] Common



        14:56:00 14:56:00                         [address]         Spir

it



                                                Road    [facility]

 

        2019 Outpatient                 [facility] 24

[id number] Common



        09:15:00 09:15:00                         [address]         Spir

it



                                                Road    [facility]

 

        2019 Outpatient                 [facility] 23

[id number] Common



        10:30:00 10:30:00                         [address]         Spir

it



                                                Road    [facility]

 

        2018 Outpatient                 [facility] 14

[id number] Common



        10:15:00 10:15:00                         t       Specialty/U         Sp

paul



                                                Specialty rology          - [facility]



                                                [facility]          [facility]

 

        2018 Outpatient                 [facility] 14

[id number] Common



        10:02:00 10:02:00                         [address]         Spir

it



                                                Road    [facility]

 

        2018 Outpatient                 [facility] 14

[id number] Common



        14:30:00 14:30:00                         [address]         Spir

it



                                                [address]    [facility]







Results

This patient has no known results.

## 2023-01-14 NOTE — RAD REPORT
EXAM DESCRIPTION:  RAD - Chest Single View - 1/14/2023 9:32 am

 

CLINICAL HISTORY:  COUGH

 

COMPARISON:  <Comparisons>

 

FINDINGS:  Lines: None.

Lungs: No evidence of edema or pneumonia.

Pleural: No significant pleural effusions or pneumothorax.

Cardiac: The heart size is within normal limits.

Mediastinum: Within normal limits.

Bones: No acute fractures.

Other: None

 

IMPRESSION:  No acute cardiopulmonary disease.

## 2023-01-14 NOTE — ER
Nurse's Notes                                                                                     

 [facility]                                                                 

Name: Francisco Reagan                                                                           

Age: 72 yrs                                                                                       

Sex: Male                                                                                         

: 1950                                                                                   

MRN: [id number]                                                                                   

Arrival Date: 2023                                                                          

Time: 09:14                                                                                       

Account#: [id number]                                                                            

Bed 7                                                                                             

Private MD:                                                                                       

Diagnosis: Dementia in other diseases classified elsewhere without behavioral                     

  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              

  mental status, unspecified                                                                      

                                                                                                  

Presentation:                                                                                     

                                                                                             

09:17 Chief complaint: EMS states: SO states that she was lying in bed w/ him this morning    ph  

      when he began to have tremors and decreased LOC, hx of dementia but wife reports that       

      he has been more confused than usual, also had 2 episodes of vomiting, VSS FSBG 140s.       

      Coronavirus screen: Vaccine status: Patient reports receiving the 2nd dose of the covid     

      vaccine. Ebola Screen: No symptoms or risks identified at this time. Initial Sepsis         

      Screen: Does the patient meet any 2 criteria? No. Patient's initial sepsis screen is        

      negative. Does the patient have a suspected source of infection? No. Patient's initial      

      sepsis screen is negative. Risk Assessment: Do you want to hurt yourself or someone         

      else? Patient reports no desire to harm self or others. Onset of symptoms was 2023.                                                                                   

09:17 Method Of Arrival: EMS: [facility]                                                      

09:17 Acuity: GAURAV 3                                                                           ph  

                                                                                                  

Triage Assessment:                                                                                

:21 General: Appears in no apparent distress. well groomed, Behavior is quiet. Pain: Unable ph  

      to use pain scale. Does not appear to understand pain scale. Neuro: Level of                

      Consciousness is awake, alert, Oriented to person. Cardiovascular: Capillary refill < 3     

      seconds in bilateral fingers Patient's skin is warm and dry. Respiratory: Airway is         

      patent Respiratory effort is even, unlabored, Respiratory pattern is regular,               

      symmetrical. GI: Parent/caregiver reports the patient having vomiting. Derm: Skin is        

      pink, warm \T\ dry. Musculoskeletal: Circulation, motion, and sensation intact. Range of    

      motion: intact in all extremities.                                                          

                                                                                                  

Historical:                                                                                       

- Allergies:                                                                                      

09:20 No Known Allergies;                                                                     ph  

- PMHx:                                                                                           

09:20 Dementia;                                                                               ph  

                                                                                                  

- Immunization history:: Adult Immunizations unknown.                                             

- Social history:: Smoking status: unknown.                                                       

- Family history:: not pertinent.                                                                 

                                                                                                  

                                                                                                  

Screenin:23 [facility] ED Fall Risk Assessment (Adult) History of falling in the last 3 months,       ph  

      including since admission Yes- physiologic fall (2 pts) Confusion or Disorientation Yes     

      (5 pts) Intoxicated or Sedated No (0 pts) Impaired Gait Yes (1 pt) Mobility Assist          

      Device Used Yes (1 pt) Altered Elimination Yes (1 pt) Score/Fall Risk Level 3 or more       

      points = High Risk Oriented to surroundings, Maintained a safe environment, Activated       

      bed/chair alarm, Remained w/in arm's length of patient and in sight while toileting,        

      Offered frequent toileting (1:1 observation), Remained with patient while ambulating.       

      Abuse screen: Denies threats or abuse. Denies injuries from another.                        

09:24 Nutritional screening: No deficits noted. Tuberculosis screening: No symptoms or risk   ph  

      factors identified.                                                                         

                                                                                                  

Assessment:                                                                                       

09:25 Reassessment: SEE TRIAGE ASSESSMENT.                                                    ph  

11:00 Reassessment: Patient appears in no apparent distress at this time. No changes from     ph  

      previously documented assessment. Patient and/or family updated on plan of care and         

      expected duration. Pain level reassessed.                                                   

12:00 Reassessment: Patient appears in no apparent distress at this time. No changes from     ph  

      previously documented assessment. Patient and/or family updated on plan of care and         

      expected duration. Pain level reassessed.                                                   

14:47 Reassessment: Patient appears in no apparent distress at this time. Patient and/or      ph  

      family updated on plan of care and expected duration. Pain level reassessed. Pt more        

      alert, speaking to family at bedside.                                                       

15:34 Reassessment: Patient appears in no apparent distress at this time. No changes from     ph  

      previously documented assessment. Patient and/or family updated on plan of care and         

      expected duration. Pain level reassessed. Report called to [facility].                         

                                                                                                  

Vital Signs:                                                                                      

09:17  / 77; Pulse 79; Resp 20; Temp 98.5; Pulse Ox 97% on R/A; Weight 70.31 kg; Height ph  

      5 ft. 9 in. (175.26 cm);                                                                    

10:30  / 68; Pulse 87; Resp 18; Pulse Ox 99% on R/A;                                    ph  

11:30  / 72; Pulse 86; Resp 18; Pulse Ox 98% on R/A;                                    ph  

12:30  / 71; Pulse 81; Resp 18; Pulse Ox 99% on R/A;                                    ph  

13:30  / 78; Pulse 81; Resp 18; Pulse Ox 99% on R/A;                                    ph  

14:41  / 69; Pulse 82; Resp 18; Temp 97.8; Pulse Ox 100% on R/A;                        ph  

09:17 Body Mass Index 22.89 (70.31 kg, 175.26 cm)                                             ph  

                                                                                                  

ED Course:                                                                                        

09:14 Patient arrived in ED.                                                                  eb  

09:17 Lissa Shahid RN is Primary Nurse.                                                    ph  

09:18 Sina Morel MD is Attending Physician.                                             za 

09:20 Triage completed.                                                                       ph  

09:23 Arm band placed on Patient placed in an exam room.                                      ph  

09:23 Patient has correct armband on for positive identification. Bed in low position. Call   ph  

      light in reach. Side rails up X2. Client placed on continuous cardiac and pulse             

      oximetry monitoring. NIBP monitoring applied. Door closed. Noise minimized. Warm            

      blanket given.                                                                              

09:34 XRAY Chest (1 view) In Process Unspecified.                                             EDMS

09:40 Initial lab(s) drawn, by me, sent to lab. First set of blood cultures drawn. Inserted   ph  

      saline lock: 20 gauge in right forearm, using aseptic technique. Blood collected.           

09:49 CT Head Brain wo Cont In Process Unspecified.                                           EDMS

10:10 Ignacio Guerra is Hospitalizing Provider.                                               za 

14:33 No provider procedures requiring assistance completed. Patient admitted, IV remains in  ph  

      place.                                                                                      

                                                                                                  

Administered Medications:                                                                         

09:28 Not Given (Duplicate Order): NS 0.9% 1000 ml IV at 125 ml/hr continuous                 za 

09:45 Drug: NS 0.9% 500 ml Route: IV; Rate: bolus; Site: right forearm;                       hb  

11:00 Follow up: Response: No adverse reaction; IV Status: Completed infusion                 ph  

09:45 Drug: Thiamine 100 mg Route: IV; Rate: per protocol; Site: right forearm;               hb  

14:48 Follow up: Response: No adverse reaction; IV Status: Completed infusion                 ph  

09:45 Drug: Banana Bag - (NS 0.9% 1000 ml, foLIC Acid 1 mg, Thiamine 100 mg, Multivitamin 1   hb  

      amp) Route: IV; Rate: 125 ml/hr; Site: right forearm;                                       

14:48 Follow up: IV Status: Infusion continued upon admission                                 ph  

11:16 Drug: Rocephin (cefTRIAXone) 1 grams Route: IV; Rate: per protocol; Site: right forearm;hb  

12:00 Follow up: Response: No adverse reaction; IV Status: Completed infusion                 ph  

                                                                                                  

                                                                                                  

Medication:                                                                                       

09:24 VIS not applicable for this client.                                                     ph  

                                                                                                  

Outcome:                                                                                          

10:11 Decision to Hospitalize by Provider.                                                    za 

15:34 Admitted to Med/surg accompanied by tech, family with patient, via stretcher, room 415, ph  

      with chart.                                                                                 

15:34 Condition: stable                                                                           

15:34 Instructed on the need for admit.                                                           

15:48 Patient left the ED.                                                                    ph  

                                                                                                  

Signatures:                                                                                       

Dispatcher [employer]                           EDSina Crowley MD MD cha Hall, Patricia, RN                      RN                                                      

Nadira Jarvis RN                     RN                                                      

Izabella Kumar                                                   

                                                                                                  

**************************************************************************************************

## 2023-01-14 NOTE — RAD REPORT
EXAM DESCRIPTION:  CT - Head Brain Wo Cont - 1/14/2023 9:47 am

 

CLINICAL HISTORY:  Mental status change, persistent or worsening

 

COMPARISON:  No comparisons

 

TECHNIQUE:  All CT scans are performed using dose optimization technique as appropriate and may inclu
de automated exposure control or mA/KV adjustment according to patient size.

 

FINDINGS:  No intracranial hemorrhage, hydrocephalus or extra-axial fluid collection.No areas of brai
n edema or evidence of midline shift. Mild chronic small vessel ischemic changes. Mild cerebral atrop
hy.

 

The paranasal sinuses and mastoids are clear. The calvarium is intact.

 

IMPRESSION:  No acute intracranial abnormality.

## 2023-01-15 LAB
BUN BLD-MCNC: 13 MG/DL (ref 7–18)
GLUCOSE SERPLBLD-MCNC: 95 MG/DL (ref 74–106)
HCT VFR BLD CALC: 31.3 % (ref 39.6–49)
LYMPHOCYTES # SPEC AUTO: 1.1 K/UL (ref 0.7–4.9)
MAGNESIUM SERPL-MCNC: 2.3 MG/DL (ref 1.6–2.4)
MCV RBC: 91.6 FL (ref 80–100)
PMV BLD: 8 FL (ref 7.6–11.3)
POTASSIUM SERPL-SCNC: 3.9 MMOL/L (ref 3.5–5.1)
RBC # BLD: 3.42 M/UL (ref 4.33–5.43)

## 2023-01-15 RX ADMIN — SODIUM CHLORIDE SCH MLS: 0.9 INJECTION, SOLUTION INTRAVENOUS at 06:32

## 2023-01-15 RX ADMIN — CEFTRIAXONE SCH MLS: 1 INJECTION, POWDER, FOR SOLUTION INTRAMUSCULAR; INTRAVENOUS at 14:55

## 2023-01-15 RX ADMIN — SODIUM CHLORIDE SCH MLS: 0.9 INJECTION, SOLUTION INTRAVENOUS at 21:16

## 2023-01-15 RX ADMIN — LEVETIRACETAM SCH MLS: 100 INJECTION, SOLUTION, CONCENTRATE INTRAVENOUS at 06:32

## 2023-01-15 RX ADMIN — Medication SCH ML: at 21:17

## 2023-01-15 RX ADMIN — SODIUM CHLORIDE SCH: 0.9 INJECTION, SOLUTION INTRAVENOUS at 13:40

## 2023-01-15 RX ADMIN — ENOXAPARIN SODIUM SCH MG: 40 INJECTION SUBCUTANEOUS at 08:26

## 2023-01-15 RX ADMIN — RIVASTIGMINE SCH MG: 9.5 PATCH, EXTENDED RELEASE TRANSDERMAL at 08:26

## 2023-01-15 RX ADMIN — Medication SCH: at 08:26

## 2023-01-15 RX ADMIN — LEVETIRACETAM SCH MLS: 100 INJECTION, SOLUTION, CONCENTRATE INTRAVENOUS at 17:00

## 2023-01-15 RX ADMIN — Medication PRN MG: at 21:16

## 2023-01-15 NOTE — P.PN
Subjective


Date of Service: 01/15/23


Chief Complaint: New seizure, AMS


Patient was somnolent throughout my interaction this morning.


Spouse by his bedside gives all the history.


Spouse reported jerking movements that appear to be seizures followed by a 

period of altered mental status which could be postictal state.


No fever reported.








Physical Examination





- Vital Signs


Temperature: 97.8 F


Blood Pressure: 102/64


Pulse: 62


Respirations: 16


Pulse Ox (%): 98





- Studies


Laboratory Data (last 24 hrs)





01/15/23 07:46: Sodium 144, Potassium 3.9, BUN 13, Creatinine 0.80, Glucose 95, 

Phosphorus 3.5, Magnesium 2.3


01/15/23 07:46: WBC 7.00, Hgb 10.6 L D, Hct 31.3 L, Plt Count 192  D








Assessment And Plan





- Plan


Physical Exam


General: Somnolent.


HEENT: Atraumatic, PERRLA


Neck: Supple.


Respiratory: Clear to auscultation bilaterally


Cardiovascular: No edema, Normal pulses, Regular rate/rhythm


Gastrointestinal: Normal bowel sounds, nontender


Musculoskeletal: No swelling, no erythema, no tenderness


Integumentary: Skin lesion


Neurological: Somnolent, withdraws all limbs to touch


Lymphatics: No axilla or inguinal lymphadenopathy.





Diagnosis:


Seizure-like activity


Altered mental status


Acute cystitis without hematuria


Alzheimer dementia





Plan:


Patient likely had a seizure episode given UTI which lowers threshold for 

seizures.


Patient started on Keppra


Neurology consult


Routine EEG


Head CT with no acute findings.


IV Rocephin for UTI.


Blood culture: No growth to date.  Urine cultures pending.


Continue rivastigmine patch for dementia.


Periodic reorientation.

## 2023-01-16 VITALS — DIASTOLIC BLOOD PRESSURE: 67 MMHG | SYSTOLIC BLOOD PRESSURE: 130 MMHG | TEMPERATURE: 97.7 F

## 2023-01-16 LAB
BUN BLD-MCNC: 13 MG/DL (ref 7–18)
GLUCOSE SERPLBLD-MCNC: 104 MG/DL (ref 74–106)
HCT VFR BLD CALC: 31.7 % (ref 39.6–49)
LYMPHOCYTES # SPEC AUTO: 1.1 K/UL (ref 0.7–4.9)
MAGNESIUM SERPL-MCNC: 2.4 MG/DL (ref 1.6–2.4)
MCV RBC: 91.3 FL (ref 80–100)
PMV BLD: 8.3 FL (ref 7.6–11.3)
POTASSIUM SERPL-SCNC: 3.8 MMOL/L (ref 3.5–5.1)
RBC # BLD: 3.47 M/UL (ref 4.33–5.43)

## 2023-01-16 RX ADMIN — Medication SCH: at 07:46

## 2023-01-16 RX ADMIN — CEFTRIAXONE SCH MLS: 1 INJECTION, POWDER, FOR SOLUTION INTRAMUSCULAR; INTRAVENOUS at 07:45

## 2023-01-16 RX ADMIN — ENOXAPARIN SODIUM SCH MG: 40 INJECTION SUBCUTANEOUS at 07:45

## 2023-01-16 RX ADMIN — RIVASTIGMINE SCH MG: 9.5 PATCH, EXTENDED RELEASE TRANSDERMAL at 07:45

## 2023-01-16 RX ADMIN — LEVETIRACETAM SCH MLS: 100 INJECTION, SOLUTION, CONCENTRATE INTRAVENOUS at 05:27

## 2023-01-16 RX ADMIN — SODIUM CHLORIDE SCH MLS: 0.9 INJECTION, SOLUTION INTRAVENOUS at 05:27

## 2023-01-16 NOTE — P.DS
Admission Date: 01/15/23


Discharge Date: 01/16/23


Disposition: DC HOME/HOME HEALTH CARE


Discharge Condition: FAIR


Reason for Admission: New seizure, AMS


Brief History of Present Illness: 


72-year-old  male with past medical history of dementia.  Patient 

presented to the emergency room via EMS with complaints of altered mental status

and seizure-like activities.  Patient was evaluated in the ER, with wife at the 

bedside.  Patient is cognitively impaired due to dementia. Wife reported she 

noticed that his arms were twitching and he was shaking and was making "weird 

sounds".  This was followed by a period of unresponsiveness. She denied any 

foaming at the mouth.  Head CT done in the emergency department did not show any

acute disease. Patient was admitted for further management.





Hospital Course: 


Diagnosis:


Seizure-like activity


Altered mental status


Acute cystitis without hematuria


Alzheimer dementia





Patient admitted to the medical floor.  His symptoms likely suggest seizure 

episode.


UA suggested the presence of UTI. Seizures highly likely as UTI reduces 

threshold for seizures


Patient was treated with IV Keppra and IV Rocephin.


Urine culture yielded no growth


Patient seen by neurology Dr. Wong, EEG done and the results to be followed 

by Dr. Judith Wong noted patient is on Depakote at home and recommended to continue 

Depakote for seizures.


Keppra discontinued on discharge


Continued rivastigmine patch for dementia.


Patient seen by physical therapy and he was able to ambulate with contact-guard 

assist.  Wife feels patient is at baseline and requested for discharge.


Patient is discharged to follow-up with Dr. Wong as outpatient.





Vital Signs/Physical Exam: 














Temp Pulse Resp BP Pulse Ox


 


 97.6 F   54   16   124/70   95 


 


 01/16/23 12:00  01/16/23 12:00  01/16/23 12:00  01/16/23 12:00  01/16/23 12:00








General: In no apparent distress, Other (Awake)


Neck: JVD not distended


Respiratory: Clear to auscultation bilaterally, Normal air movement


Cardiovascular: Regular rate/rhythm, Normal S1 S2


Gastrointestinal: Soft and benign, Non-distended, No tenderness


Musculoskeletal: No swelling


Integumentary: No rashes, No cyanosis


Neurological: Normal strength at 5/5 x4 extr, Other (Shuffled gait)


Laboratory Data at Discharge: 














WBC  5.50 K/uL (4.3-10.9)   01/16/23  05:40    


 


Hgb  10.5 g/dL (13.6-17.9)  L  01/16/23  05:40    


 


Hct  31.7 % (39.6-49.0)  L  01/16/23  05:40    


 


Plt Count  183 K/uL (152-406)   01/16/23  05:40    


 


PT  12.7 SECONDS (9.5-12.5)  H  01/14/23  09:40    


 


INR  1.15   01/14/23  09:40    


 


Sodium  144 mmol/L (136-145)   01/16/23  05:40    


 


Potassium  3.8 mmol/L (3.5-5.1)   01/16/23  05:40    


 


BUN  13 mg/dL (7-18)   01/16/23  05:40    


 


Creatinine  0.75 mg/dL (0.70-1.30)   01/16/23  05:40    


 


Glucose  104 mg/dL ()   01/16/23  05:40    


 


Phosphorus  3.5 mg/dL (2.5-4.9)   01/15/23  07:46    


 


Magnesium  2.4 mg/dL (1.6-2.4)   01/16/23  05:40    


 


Total Bilirubin  0.7 mg/dL (0.2-1.0)   01/14/23  09:40    


 


AST  17 U/L (15-37)   01/14/23  09:40    


 


ALT  18 U/L (16-61)   01/14/23  09:40    


 


Alkaline Phosphatase  67 U/L ()   01/14/23  09:40    








Home Medications: 








Melatonin 10 mg PO BEDTIME PRN 01/14/23 


Rivastigmine Patch [Exelon 9.5 mg Patch] 1 patch TOP DAILY 01/14/23 


Cefpodoxime Proxetil 200 mg PO BID #20 tab 01/16/23 





New Medications: 


Cefpodoxime Proxetil 200 mg PO BID #20 tab


Diet: Regular


Activity: Fall precautions


Followup: 


Raffi Wong MD [ASSOCIATE-ACTIVE - CAN ADMIT] - 2-3 Days


NONE,NONE [Primary Care Provider] - 


Time spent managing pt's care (in minutes): 38

## 2023-01-16 NOTE — CON
Reason For Consultation:  Consultation called because of altered mental status and possible seizures.
 



Please note that patient was unable to provide any meaningful history.  History was obtained from the
 patient's wife who was in the room in addition to a friend and chart review.



Chief Complaint:  Patient's wife said he had what looked like a seizure early morning on 01/14/2023.



History Of Present Illness:  Mr. Reagan is a 72-year-old patient with a history of dementia, perha
ps at least 2 years and most likely Alzheimer's, who previously saw Dr. Zion Gee.  He is on Exe
lizzie patch __________ every 24 hours.  The patient was in bed in the early morning on the 14th when hi
s wife noted he began shaking the right arm than the left arm and was unresponsive to her verbal and 
tactile stimulation.  Activity continued perhaps 20-30 seconds and after he still was not responsive.
  She brought him to [facility] where he was evaluated for seizures.  His head CT scan showe
d no acute ischemic or hemorrhagic changes.  His blood work revealed normal white blood cell count wi
th slight decrease in hemoglobin to 12.7, platelets were normal.  INR 1.15.  His chemistries showed a
 slight decrease in GFR of 81.  Lactic acid was elevated to 2.2 with normal procalcitonin of 0.05.  L
iver function studies unremarkable.  Sodium and potassium, unremarkable.  Chloride slightly elevated.
  Glucose was 131.  Urinalysis showed 20-50 white blood cells, 11-20 red blood cells, 3+ protein.  CO
VID-19 testing influenza A and B testing were negative and his chest x-ray showed no acute cardiopulm
onary processes.  The patient did not require any IV benzodiazepine such as Ativan as he was not cont
inually seizing and wife did note he did not bite his tongue.  He did not have loss of urine or stool
 control.  Since admission, his wife says no additional episodes of seizures noted. 



In retrospect, she said that he has had a brief jerking of the arms and legs ongoing for about perhap
s 6 months to a year and his diagnosis of dementia was made over 2 years ago by Dr. Zion Gee.  
The patient has had problems recalling words, thoughts, names, places, even his wife's name, sense of
 direction and he has become largely nonverbal and may respond with 1 or 2 words or not at all to any
 questions.  He has not had any obvious stroke-like symptoms.  No face, arm, or leg numbness or weakn
ess.



Past Medical History:  As noted, dementia.



Allergies:  NO KNOWN DRUG ALLERGIES.



Medications:  At home, Dietary Fiber 1 daily, omega-3, Omegared 1 daily, multivitamin 1 daily, plant 
stanol schuyler 450 mg daily, CoQ10 100 mg daily.



Family History:  Noncontributory.



Social History:  No alcohol, tobacco, or IV drug use.



Review of Systems:

Patient is unable to give any meaningful review of systems.  His wife notes he has not had any recent
 fevers or chills, nausea, vomiting, myalgias, arthralgias, rash, headache, weight change.



Past Surgical History:  Right hip fracture surgery.



Physical Examination:

Vital Signs:  Blood pressure 124/70, pulse 64, respiratory rate 16, temperature 98.0, oxygen saturati
on 95% to 98% on room air.  Weight 155 pounds, height 5 feet 9 inches, BMI 22. 

General:  Mr. Reagan is sitting in a chair beside the bed. 

HEENT:  He is normocephalic, atraumatic.  Sclerae anicteric.  Oropharynx is pink and moist. 

Neck:  Supple. 

Chest:  Clear. 

Heart:  Regular. 

Extremities:  Show no clubbing, cyanosis, or edema. 

Neurological:  He is alert and oriented to person, but not place, situation, time.  He is only able t
o mimic commands when asked such as showing him a thumbs up that he does it.  He is unable to respond
 to follow instructions to show thumbs up to take the right hand and touch the left year or follow an
y instructions in a meaningful way.  An attempt was made to complete a mini-mental status test, but t
he patient was unable to say anything except that he answered to his name.  He is disoriented times t
o situation to place, to year, to date, month, unable to even repeat words and to follow any instruct
ions, even one-step commands.  That gives him around 0/30 on the mini-mental status test.  In terms o
f strength, he has intact 5/5 proximally and distally.  Sensation intact to upper and lower extremiti
es.  Reflexes 1+ upper and lower extremities.  Coordination intact upper and lower extremities.  Gait
, good stance, right arm swing.



Assessment:  Mr. Reagan is a 72-year-old patient with possible advanced Alzheimer disease.  He has
 had a workup by Dr. Zion Gee in the past.  Patient's family does say he has had some behaviora
l changes with potential aggression and agitation and he was put on Depakote.  Patient's wife does no
t know the dosage, but it was on once a day dosing, perhaps 250 mg extended release at night.  While 
here in [facility], he was given a loading dose of Keppra and put on Keppra 500 mg twice micaela
ly.  Continued on the Exelon patch __________ mg daily, given a liter of fluid.  He did receive Rocep
hin along with deep vein thrombosis prophylaxis of Lovenox.



Plan:  

1.At least, 250 mg extended release Depakote at night.

2.Continue Exelon patch __________ mg every 24 hours.

3.He may benefit from cranberry pills 200 mg twice daily to decrease the risk of urinary tract infec
tion.  Also, he should be hydrated at least 8 glasses of water daily.

4.He does have a followup in my clinic this Wednesday that is Monday.  Please note, he can be discha
rged home today.





ASTER/CRISTAL

DD:  01/16/2023 15:10:33Voice ID:  [id number]

DT:  01/16/2023 16:27:00Report ID:  [id number]

## 2023-01-16 NOTE — EKG
Test Date:    2023-01-14               Test Time:    10:19:30

Technician:   ANSHU                                     

                                                     

MEASUREMENT RESULTS:                                       

Intervals:                                           

Rate:         81                                     

CT:           198                                    

QRSD:         62                                     

QT:           404                                    

QTc:          469                                    

Axis:                                                

P:            82                                     

CT:           198                                    

QRS:          76                                     

T:            72                                     

                                                     

INTERPRETIVE STATEMENTS:                                       

                                                     

Normal sinus rhythm

Nonspecific ST and T wave abnormality

Abnormal ECG

Compared to ECG 12/21/2022 10:17:36

ST (T wave) deviation now present



Electronically Signed On 01-16-23 17:33:24 CST by Bobby Maravilla

## 2023-01-20 NOTE — EEG
CHART:  [id number]

TEST ID#:  [id number]

DATE OF STUDY:  01-



THE EEG WAS RECORDED PORTABLE IN THE PATIENT'S ON A 17 CHANNEL MACHINE.  ELECTRODES WERE 
APPLIED IN THE USUAL MANNER USING THE INTERNATIONAL 10-20 SYSTEM.



THE WAKING BACKGROUND RHYTHM IN THIS RECORD CONSISTS OF POORLY DEVELOPED AND POORLY 
ORGANIZED WAVES OF 6-7 HZ., MAXIMAL IN THE POSTERIOR HEAD REGIONS WHICH ATTENUATE POORLY 
WITH EYE OPENING.  MODERATE VOLTAGE 4-6 HZ MIXED WITH OCCASIONAL 1.5-3 HZ MODERATE VOLTAGE 
IS EXPRESSED IN THE FRONTAL REGIONS.



THERE ARE NO FOCAL OR LATERALIZING FEATURES.  NO EPILEPTIFORM ACTIVITY APPEARS.  

SLEEP DID NOT OCCUR.  



HYPERVENTILATION WAS NOT PERFORMED.



PHOTIC STIMULATION PRODUCED NO DRIVING BILATERALLY.



IMPRESSION:  THIS IS A MODERATELY ABNORMAL ROUTINE EEG DUE TO A MODERATELY SLOW 
BACKGROUND.  THIS FINDING IS NON-SPECIFIC AND CONSISTENT WITH A MODERATE DIFFUSE 
DISTURBANCE IN CEREBRAL FUNCTION.

## 2024-08-22 NOTE — EDPHYS
[facility]                   [address] [place] [address]                            OPERATIVE REPORT      PATIENT NAME: CHUCKY ALBERTO                : 1956  MED REC NO: [id number]                        ROOM: MLOZ OR Pool  ACCOUNT NO: [id number]                       ADMIT DATE: 2024  PROVIDER: She Garibay MD      DATE OF PROCEDURE:  2024    SURGEON:  She Garibay MD    FIRST ASSISTANT:  Jerel eCspedes CNP.  Please note, a surgical assistant was utilized for all key portions of procedure including excision, extraction, retraction, and construction.  No surgical residents or fellows were utilized for any portions of the procedure.    PREOPERATIVE DIAGNOSIS:  Open wound, nose.    POSTOPERATIVE DIAGNOSIS:  Open wound, nose.    PROCEDURES:    1. Graft site preparation, nose.  2. Transposition of forehead flap to nose, 7 x 3 cm.  3. Split-thickness skin graft to deep surface of forehead flap, 2 x 6 cm, which is 12 sq cm.  4. Application of Integra to forehead open wound donor site, 3 x 4 cm, which is 12 sq cm.    ANESTHESIA:  General endotracheal anesthesia.    HISTORY OF PRESENT ILLNESS:  The patient is a 67-year-old female who underwent a Mohs excision of a basal cell carcinoma on her nasal tip.  She was left with a very large open wound on her nasal tip with complete exposure of her lower lateral cartilages with no evidence of any perichondrium above.  She underwent the application of Integra for temporary biologic coverage over the exposed cartilage as well as the delay of the forehead flap procedure.  She now presents for transposition of the forehead flap with closure and possible reconstruction of the donor site using the application of Integra and the application of skin graft to the deep surface of the forehead flap for prevention of drainage of serum to the eyes bilaterally.  All the risks and benefits of the procedure, including the transposition of the  Physician Documentation                                                                           

 [facility]                                                                 

Name: Francisco Reagan                                                                           

Age: 72 yrs                                                                                       

Sex: Male                                                                                         

: 1950                                                                                   

MRN: [id number]                                                                                   

Arrival Date: 2023                                                                          

Time: :                                                                                       

Account#: [id number]                                                                            

Bed 7                                                                                             

Private MD:                                                                                       

ED Physician Sina Morel                                                                      

HPI:                                                                                              

                                                                                             

09:31 This 72 yrs old  Male presents to ER via EMS with complaints of ams, seizure   za 

      activity.                                                                                   

09:31 The patient's problem is reported as an apparent seizure, weakness, that is             za 

      generalized. Onset: The symptoms/episode began/occurred this morning, today. Duration:      

      This was a single incident. Context: the episode(s) was witnessed, by family, wife. The     

      symptoms are alleviated by nothing. The symptoms are aggravated by nothing. Possible        

      causes: CVA or TIA, low blood sugar, seizure. Seizure onset: just prior to arrival,         

      this morning.                                                                               

                                                                                                  

Historical:                                                                                       

- Allergies:                                                                                      

09:20 No Known Allergies;                                                                     ph  

- PMHx:                                                                                           

09:20 Dementia;                                                                               ph  

                                                                                                  

- Immunization history:: Adult Immunizations unknown.                                             

- Social history:: Smoking status: unknown.                                                       

- Family history:: not pertinent.                                                                 

                                                                                                  

                                                                                                  

ROS:                                                                                              

09:31 Constitutional: Negative for fever, chills, and weight loss, Eyes: Negative for injury, za 

      pain, redness, and discharge, ENT: Negative for injury, pain, and discharge, Neck:          

      Negative for injury, pain, and swelling, Cardiovascular: Negative for chest pain,           

      palpitations, and edema, Respiratory: Negative for shortness of breath, cough,              

      wheezing, and pleuritic chest pain, Abdomen/GI: Negative for abdominal pain, nausea,        

      vomiting, diarrhea, and constipation, Back: Negative for injury and pain, : Negative      

      for injury, bleeding, discharge, and swelling, MS/Extremity: Negative for injury and        

      deformity, Skin: Negative for injury, rash, and discoloration, Psych: Negative for          

      depression, anxiety, suicide ideation, homicidal ideation, and hallucinations,              

      Allergy/Immunology: Negative for hives, rash, and allergies, Endocrine: Negative for        

      neck swelling, polydipsia, polyuria, polyphagia, and marked weight changes,                 

      Hematologic/Lymphatic: Negative for swollen nodes, abnormal bleeding, and unusual           

      bruising.                                                                                   

09:31 Neuro: Positive for altered mental status, seizure activity, weakness.                      

                                                                                                  

Exam:                                                                                             

:31 Constitutional:  This is a well developed, well nourished patient who is awake, alert,  za 

      and in no acute distress. Head/Face:  Normocephalic, atraumatic. Eyes:  Pupils equal        

      round and reactive to light, extra-ocular motions intact.  Lids and lashes normal.          

      Conjunctiva and sclera are non-icteric and not injected.  Cornea within normal limits.      

      Periorbital areas with no swelling, redness, or edema. ENT:  Nares patent. No nasal         

      discharge, no septal abnormalities noted.  Tympanic membranes are normal and external       

      auditory canals are clear.  Oropharynx with no redness, swelling, or masses, exudates,      

      or evidence of obstruction, uvula midline.  Mucous membranes moist. Neck:  Trachea          

      midline, no thyromegaly or masses palpated, and no cervical lymphadenopathy.  Supple,       

      full range of motion without nuchal rigidity, or vertebral point tenderness.  No            

      Meningismus. Chest/axilla:  Normal chest wall appearance and motion.  Nontender with no     

      deformity.  No lesions are appreciated. Cardiovascular:  Regular rate and rhythm with a     

      normal S1 and S2.  No gallops, murmurs, or rubs.  Normal PMI, no JVD.  No pulse             

      deficits. Respiratory:  Lungs have equal breath sounds bilaterally, clear to                

      auscultation and percussion.  No rales, rhonchi or wheezes noted.  No increased work of     

      breathing, no retractions or nasal flaring. Abdomen/GI:  Soft, non-tender, with normal      

      bowel sounds.  No distension or tympany.  No guarding or rebound.  No evidence of           

      tenderness throughout. Back:  No spinal tenderness.  No costovertebral tenderness.          

      Full range of motion. Male :  Normal genitalia with no discharge or lesions. Skin:        

      Warm, dry with normal turgor.  Normal color with no rashes, no lesions, and no evidence     

      of cellulitis. MS/ Extremity:  Pulses equal, no cyanosis.  Neurovascular intact.  Full,     

      normal range of motion. Psych:  Awake, alert, with orientation to person, place and         

      time.  Behavior, mood, and affect are within normal limits.                                 

09:31 Neuro: Orientation: unable to test, Mentation: appropriate for stated age, Memory:          

      unable to test, Cranial nerves: is grossly normal based on the patient's age, no acute      

      changes, Cerebellar function: unable to test, Motor: moves all fours, Sensation: unable     

      to test, Gait: not tested. seizure activity, is not displayed by the patient.               

09:41 Radiologist reports: nad, old changes                                                   za 

10:41 ECG was reviewed by the Attending Physician.                                            za 

                                                                                                  

Vital Signs:                                                                                      

09:17  / 77; Pulse 79; Resp 20; Temp 98.5; Pulse Ox 97% on R/A; Weight 70.31 kg; Height ph  

      5 ft. 9 in. (175.26 cm);                                                                    

10:30  / 68; Pulse 87; Resp 18; Pulse Ox 99% on R/A;                                    ph  

11:30  / 72; Pulse 86; Resp 18; Pulse Ox 98% on R/A;                                    ph  

12:30  / 71; Pulse 81; Resp 18; Pulse Ox 99% on R/A;                                    ph  

13:30  / 78; Pulse 81; Resp 18; Pulse Ox 99% on R/A;                                    ph  

14:41  / 69; Pulse 82; Resp 18; Temp 97.8; Pulse Ox 100% on R/A;                        ph  

09:17 Body Mass Index 22.89 (70.31 kg, 175.26 cm)                                             ph  

                                                                                                  

MDM:                                                                                              

09:18 Patient medically screened.                                                             za 

09:41 Differential diagnosis: CVA, TIA, Dementia, Alzheimer disease, Parkinson disease,       za 

      metabolic disorder. Differential Diagnosis: CVA, electrolyte abnormality, hypoglycemia,     

      intracranial bleed, seizure, sepsis, UTI, volume depletion, cerebral vascular accident,     

      cardiac arrhythmia, seizure, TIA. Data reviewed: vital signs, nurses notes, EMS record,     

      lab test result(s), EKG, radiologic studies, CT scan. Consideration of                      

      Admission/Observation Patient was admitted/placed on observation. Escalation of care        

      including admission/observation considered. Management of patient was discussed with        

      the following: Hospitalist: hospitalist and dr kramer. Test considered but Not            

      performed: MRI: mri not done. ED course: dementia.                                          

                                                                                                  

                                                                                             

09:23 Order name: Basic Metabolic Panel; Complete Time: 11:12                                 [facility] 

                                                                                             

09:23 Order name: CBC with Diff; Complete Time: 11:12                                         [facility] 

                                                                                             

09:23 Order name: LFT's; Complete Time: 11:12                                                 za 

                                                                                             

09:23 Order name: Magnesium; Complete Time: 11:12                                             za 

                                                                                             

09:23 Order name: NT PRO-BNP; Complete Time: 11:12                                            [facility] 

                                                                                             

09:23 Order name: PT-INR; Complete Time: 10:02                                                                                                                                             

09:23 Order name: Troponin HS; Complete Time: 11:12                                                                                                                                        

09:23 Order name: Urine Microscopic Only                                                                                                                                                   

09:23 Order name: Blood Culture Adult (2)                                                                                                                                                  

09:23 Order name: Lactate w/ 2H reflex if indic.; Complete Time: 11:12                                                                                                                     

09:23 Order name: COVID-19/FLU A+B; Complete Time: 11:12                                      [facility] 

                                                                                             

11:19 Order name: Urine Dipstick-Ancillary                                                    [facility]

                                                                                             

12:09 Order name: Procalcitonin                                                               [facility]

                                                                                             

12:19 Order name: Creatine Phosphokinase                                                      [facility]

                                                                                             

09:23 Order name: XRAY Chest (1 view); Complete Time: 10:09                                                                                                                                

09:23 Order name: EKG; Complete Time: 09:24                                                                                                                                                

09:23 Order name: Cardiac monitoring; Complete Time: 09:24                                                                                                                                 

09:23 Order name: EKG - Nurse/Tech; Complete Time: 09:52                                                                                                                                   

09:23 Order name: IV Saline Lock; Complete Time: 09:46                                                                                                                                     

09:23 Order name: Labs collected and sent; Complete Time: 09:46                                                                                                                            

09:23 Order name: O2 Per Protocol; Complete Time: 09:24                                                                                                                                    

09:23 Order name: O2 Sat Monitoring; Complete Time: 09:24                                                                                                                                  

09:23 Order name: CT Head Brain wo Cont; Complete Time: 10:09                                                                                                                              

09:23 Order name: Urine Dipstick-Ancillary (obtain specimen); Complete Time: 11:17            [facility] 

                                                                                                  

ECG:                                                                                              

10:41 Rate is 81 beats/min. Rhythm is regular. QRS Axis is Normal. OK interval is normal. QRS za 

      interval is normal. QT interval is normal. No Q waves. Clinical impression: NSR w/          

      Non-specific ST/T Changes and No evidence of ischemia. Interpreted by me. Reviewed by       

      me.                                                                                         

                                                                                                  

Administered Medications:                                                                         

09:28 Not Given (Duplicate Order): NS 0.9% 1000 ml IV at 125 ml/hr continuous                 za 

09:45 Drug: NS 0.9% 500 ml Route: IV; Rate: bolus; Site: right forearm;                       hb  

11:00 Follow up: Response: No adverse reaction; IV Status: Completed infusion                 ph  

09:45 Drug: Thiamine 100 mg Route: IV; Rate: per protocol; Site: right forearm;               hb  

14:48 Follow up: Response: No adverse reaction; IV Status: Completed infusion                 ph  

09:45 Drug: Banana Bag - (NS 0.9% 1000 ml, foLIC Acid 1 mg, Thiamine 100 mg, Multivitamin 1   hb  

      amp) Route: IV; Rate: 125 ml/hr; Site: right forearm;                                       

14:48 Follow up: IV Status: Infusion continued upon admission                                 ph  

11:16 Drug: Rocephin (cefTRIAXone) 1 grams Route: IV; Rate: per protocol; Site: right forearm;hb  

12:00 Follow up: Response: No adverse reaction; IV Status: Completed infusion                 ph  

                                                                                                  

                                                                                                  

Disposition Summary:                                                                              

23 10:11                                                                                    

Hospitalization Ordered                                                                           

      Hospitalization Status: Observation                                                     za 

      Provider: Ignacio Guerra cha 

      Location: Telemetry/MedSurg (observation)                                               za 

      Condition: Fair                                                                         za 

      Problem: new                                                                            za 

      Symptoms: have improved                                                                 za 

      Bed/Room Type: Standard                                                                 [facility] 

      Room Assignment: 415(23 14:11)                                                    eb  

      Diagnosis                                                                                   

        - Dementia in other diseases classified elsewhere without behavioral disturbance      za 

        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 

        - Altered mental status, unspecified                                                  za 

      Forms:                                                                                      

        - Medication Reconciliation Form                                                      za 

        - SBAR form                                                                           za 

Signatures:                                                                                       

Dispatcher [employer]                           EDSina Crowley MD MD cha Waters, Shelly, FNP-C                   FNP-Csnw                                                  

Lissa Shahid RN                      RN   Nadira Knight RN RN hb Botello, Elizabeth eb                                                   

                                                                                                  

Corrections: (The following items were deleted from the chart)                                    

14:11 10:11 za                                                                               eb  

                                                                                                  

**************************************************************************************************